# Patient Record
Sex: MALE | Race: WHITE | NOT HISPANIC OR LATINO | Employment: FULL TIME | URBAN - METROPOLITAN AREA
[De-identification: names, ages, dates, MRNs, and addresses within clinical notes are randomized per-mention and may not be internally consistent; named-entity substitution may affect disease eponyms.]

---

## 2017-03-09 ENCOUNTER — APPOINTMENT (OUTPATIENT)
Dept: CT IMAGING | Facility: HOSPITAL | Age: 67
End: 2017-03-09

## 2017-03-09 ENCOUNTER — HOSPITAL ENCOUNTER (INPATIENT)
Facility: HOSPITAL | Age: 67
LOS: 4 days | Discharge: HOME | End: 2017-03-13
Attending: EMERGENCY MEDICINE | Admitting: HOSPITALIST

## 2017-03-09 DIAGNOSIS — H47.021 OPTIC NERVE HEMORRHAGE, RIGHT: ICD-10-CM

## 2017-03-09 DIAGNOSIS — I16.0 HYPERTENSIVE URGENCY: Primary | ICD-10-CM

## 2017-03-09 LAB
ALBUMIN SERPL-MCNC: 4 G/DL (ref 3.5–5.2)
ALBUMIN/GLOB SERPL: 1.3 G/DL
ALP SERPL-CCNC: 63 U/L (ref 39–117)
ALT SERPL W P-5'-P-CCNC: 17 U/L (ref 1–41)
ANION GAP SERPL CALCULATED.3IONS-SCNC: 11.2 MMOL/L
AST SERPL-CCNC: 16 U/L (ref 1–40)
BASOPHILS # BLD AUTO: 0.05 10*3/MM3 (ref 0–0.2)
BASOPHILS NFR BLD AUTO: 0.9 % (ref 0–1.5)
BILIRUB SERPL-MCNC: 0.5 MG/DL (ref 0.1–1.2)
BUN BLD-MCNC: 19 MG/DL (ref 8–23)
BUN/CREAT SERPL: 12.2 (ref 7–25)
CALCIUM SPEC-SCNC: 9 MG/DL (ref 8.6–10.5)
CHLORIDE SERPL-SCNC: 101 MMOL/L (ref 98–107)
CO2 SERPL-SCNC: 27.8 MMOL/L (ref 22–29)
CREAT BLD-MCNC: 1.56 MG/DL (ref 0.76–1.27)
DEPRECATED RDW RBC AUTO: 43.4 FL (ref 37–54)
EOSINOPHIL # BLD AUTO: 0.21 10*3/MM3 (ref 0–0.7)
EOSINOPHIL NFR BLD AUTO: 3.8 % (ref 0.3–6.2)
ERYTHROCYTE [DISTWIDTH] IN BLOOD BY AUTOMATED COUNT: 13.9 % (ref 11.5–14.5)
GFR SERPL CREATININE-BSD FRML MDRD: 45 ML/MIN/1.73
GLOBULIN UR ELPH-MCNC: 3.1 GM/DL
GLUCOSE BLD-MCNC: 105 MG/DL (ref 65–99)
HCT VFR BLD AUTO: 42.8 % (ref 40.4–52.2)
HGB BLD-MCNC: 14.4 G/DL (ref 13.7–17.6)
HOLD SPECIMEN: NORMAL
HOLD SPECIMEN: NORMAL
IMM GRANULOCYTES # BLD: 0 10*3/MM3 (ref 0–0.03)
IMM GRANULOCYTES NFR BLD: 0 % (ref 0–0.5)
INR PPP: 1.06 (ref 0.9–1.1)
LYMPHOCYTES # BLD AUTO: 1.21 10*3/MM3 (ref 0.9–4.8)
LYMPHOCYTES NFR BLD AUTO: 21.7 % (ref 19.6–45.3)
MCH RBC QN AUTO: 29 PG (ref 27–32.7)
MCHC RBC AUTO-ENTMCNC: 33.6 G/DL (ref 32.6–36.4)
MCV RBC AUTO: 86.1 FL (ref 79.8–96.2)
MONOCYTES # BLD AUTO: 0.51 10*3/MM3 (ref 0.2–1.2)
MONOCYTES NFR BLD AUTO: 9.1 % (ref 5–12)
NEUTROPHILS # BLD AUTO: 3.6 10*3/MM3 (ref 1.9–8.1)
NEUTROPHILS NFR BLD AUTO: 64.5 % (ref 42.7–76)
NT-PROBNP SERPL-MCNC: 136 PG/ML (ref 0–900)
PLATELET # BLD AUTO: 190 10*3/MM3 (ref 140–500)
PMV BLD AUTO: 10.1 FL (ref 6–12)
POTASSIUM BLD-SCNC: 4.2 MMOL/L (ref 3.5–5.2)
PROT SERPL-MCNC: 7.1 G/DL (ref 6–8.5)
PROTHROMBIN TIME: 13.4 SECONDS (ref 11.7–14.2)
RBC # BLD AUTO: 4.97 10*6/MM3 (ref 4.6–6)
SODIUM BLD-SCNC: 140 MMOL/L (ref 136–145)
TROPONIN T SERPL-MCNC: <0.01 NG/ML (ref 0–0.03)
TROPONIN T SERPL-MCNC: <0.01 NG/ML (ref 0–0.03)
WBC NRBC COR # BLD: 5.58 10*3/MM3 (ref 4.5–10.7)
WHOLE BLOOD HOLD SPECIMEN: NORMAL
WHOLE BLOOD HOLD SPECIMEN: NORMAL

## 2017-03-09 PROCEDURE — 83880 ASSAY OF NATRIURETIC PEPTIDE: CPT | Performed by: EMERGENCY MEDICINE

## 2017-03-09 PROCEDURE — 99285 EMERGENCY DEPT VISIT HI MDM: CPT

## 2017-03-09 PROCEDURE — 85610 PROTHROMBIN TIME: CPT | Performed by: EMERGENCY MEDICINE

## 2017-03-09 PROCEDURE — 84484 ASSAY OF TROPONIN QUANT: CPT | Performed by: NURSE PRACTITIONER

## 2017-03-09 PROCEDURE — 93005 ELECTROCARDIOGRAM TRACING: CPT | Performed by: EMERGENCY MEDICINE

## 2017-03-09 PROCEDURE — 99253 IP/OBS CNSLTJ NEW/EST LOW 45: CPT | Performed by: INTERNAL MEDICINE

## 2017-03-09 PROCEDURE — 70450 CT HEAD/BRAIN W/O DYE: CPT

## 2017-03-09 PROCEDURE — 25010000002 HYDRALAZINE PER 20 MG: Performed by: NURSE PRACTITIONER

## 2017-03-09 PROCEDURE — 25810000003 SODIUM CHLORIDE 0.9 % WITH KCL 20 MEQ 20-0.9 MEQ/L-% SOLUTION: Performed by: HOSPITALIST

## 2017-03-09 PROCEDURE — 80053 COMPREHEN METABOLIC PANEL: CPT | Performed by: EMERGENCY MEDICINE

## 2017-03-09 PROCEDURE — 85025 COMPLETE CBC W/AUTO DIFF WBC: CPT | Performed by: EMERGENCY MEDICINE

## 2017-03-09 PROCEDURE — 93010 ELECTROCARDIOGRAM REPORT: CPT | Performed by: INTERNAL MEDICINE

## 2017-03-09 PROCEDURE — 84484 ASSAY OF TROPONIN QUANT: CPT | Performed by: EMERGENCY MEDICINE

## 2017-03-09 RX ORDER — HYDRALAZINE HYDROCHLORIDE 20 MG/ML
20 INJECTION INTRAMUSCULAR; INTRAVENOUS ONCE
Status: COMPLETED | OUTPATIENT
Start: 2017-03-09 | End: 2017-03-09

## 2017-03-09 RX ORDER — CLONIDINE HYDROCHLORIDE 0.1 MG/1
0.1 TABLET ORAL ONCE
Status: DISCONTINUED | OUTPATIENT
Start: 2017-03-09 | End: 2017-03-09

## 2017-03-09 RX ORDER — LEFLUNOMIDE 10 MG/1
10 TABLET ORAL DAILY
COMMUNITY
End: 2018-07-04

## 2017-03-09 RX ORDER — LABETALOL HYDROCHLORIDE 5 MG/ML
10 INJECTION, SOLUTION INTRAVENOUS ONCE
Status: DISCONTINUED | OUTPATIENT
Start: 2017-03-09 | End: 2017-03-09

## 2017-03-09 RX ORDER — AMLODIPINE BESYLATE 10 MG/1
10 TABLET ORAL DAILY
Status: DISCONTINUED | OUTPATIENT
Start: 2017-03-09 | End: 2017-03-13 | Stop reason: HOSPADM

## 2017-03-09 RX ORDER — LEFLUNOMIDE 10 MG/1
10 TABLET ORAL DAILY
Status: DISCONTINUED | OUTPATIENT
Start: 2017-03-09 | End: 2017-03-13 | Stop reason: HOSPADM

## 2017-03-09 RX ORDER — SODIUM CHLORIDE AND POTASSIUM CHLORIDE 150; 900 MG/100ML; MG/100ML
75 INJECTION, SOLUTION INTRAVENOUS CONTINUOUS
Status: DISCONTINUED | OUTPATIENT
Start: 2017-03-09 | End: 2017-03-12

## 2017-03-09 RX ORDER — ASPIRIN 81 MG/1
81 TABLET, CHEWABLE ORAL DAILY
COMMUNITY

## 2017-03-09 RX ORDER — HYDRALAZINE HYDROCHLORIDE 20 MG/ML
10 INJECTION INTRAMUSCULAR; INTRAVENOUS EVERY 4 HOURS PRN
Status: DISCONTINUED | OUTPATIENT
Start: 2017-03-09 | End: 2017-03-13 | Stop reason: HOSPADM

## 2017-03-09 RX ORDER — CLONIDINE HYDROCHLORIDE 0.1 MG/1
0.1 TABLET ORAL ONCE
Status: COMPLETED | OUTPATIENT
Start: 2017-03-09 | End: 2017-03-09

## 2017-03-09 RX ORDER — SODIUM CHLORIDE 0.9 % (FLUSH) 0.9 %
10 SYRINGE (ML) INJECTION AS NEEDED
Status: DISCONTINUED | OUTPATIENT
Start: 2017-03-09 | End: 2017-03-13 | Stop reason: HOSPADM

## 2017-03-09 RX ORDER — ASPIRIN 81 MG/1
81 TABLET, CHEWABLE ORAL DAILY
Status: DISCONTINUED | OUTPATIENT
Start: 2017-03-09 | End: 2017-03-13 | Stop reason: HOSPADM

## 2017-03-09 RX ORDER — SODIUM PHOSPHATE,MONO-DIBASIC 19G-7G/118
ENEMA (ML) RECTAL DAILY
Status: ON HOLD | COMMUNITY
End: 2017-03-09

## 2017-03-09 RX ORDER — PANTOPRAZOLE SODIUM 40 MG/1
40 TABLET, DELAYED RELEASE ORAL
Status: DISCONTINUED | OUTPATIENT
Start: 2017-03-10 | End: 2017-03-11

## 2017-03-09 RX ADMIN — HYDRALAZINE HYDROCHLORIDE 20 MG: 20 INJECTION INTRAMUSCULAR; INTRAVENOUS at 18:40

## 2017-03-09 RX ADMIN — METOPROLOL TARTRATE 25 MG: 25 TABLET ORAL at 21:03

## 2017-03-09 RX ADMIN — AMLODIPINE BESYLATE 10 MG: 10 TABLET ORAL at 17:30

## 2017-03-09 RX ADMIN — CLONIDINE HYDROCHLORIDE 0.1 MG: 0.1 TABLET ORAL at 13:42

## 2017-03-09 RX ADMIN — POTASSIUM CHLORIDE AND SODIUM CHLORIDE 75 ML/HR: 900; 150 INJECTION, SOLUTION INTRAVENOUS at 17:30

## 2017-03-09 NOTE — ED NOTES
"Pt denies chest pain and any pain or discomfort. Pt c/o dull headache at times, but nothing \"that was really bad\"     Meseret Joseph, RN  03/09/17 2042    "

## 2017-03-09 NOTE — H&P
CHIEF COMPLAINT:  Increased blood pressure.     HISTORY:  A 66-year-old white male with no significant past medical history, except for osteoarthritis/rheumatoid arthritis, who has some vision problem; it has been followed by ophthalmology, as he described floater that moves in the right eye with some mild headache. Ended up in ER; workup in ER reveals very high blood pressure with hypertensive urgency; admitted for management. Patient denies any chest pain, but he does have some headache, he does have some weakness, and he still has problem with right eye which has been followed by his ophthalmologist. No trauma, injury, or fall.     PAST MEDICAL HISTORY:   1.  Osteoarthritis.  2.  Rheumatoid arthritis.     PAST SURGICAL HISTORY:  Status post hernia repair     SOCIAL HISTORY:  No tobacco, alcohol, drug abuse.     FAMILY HISTORY:  Not contributory.     MEDICATIONS:   1.  Arava.  2.  Enbrel.  3.  Aspirin.  4.  Chondroitin.    PHYSICAL EXAMINATION:  GENERAL: Very pleasant, cooperative white male; no respiratory distress.   VITAL SIGNS: Afebrile, pulse 57, respiration 18, blood pressure 192/107, O2 saturation is 97% room air.   HEENT: Unremarkable, except for his right vision issue which is followed by ophthalmology.   NECK: Supple.   LUNGS: Clear.   HEART: S1, S2.   ABDOMEN: Soft, nontender. Bowel sounds positive.   EXTREMITIES: No edema.     NEUROLOGICAL: No focal deficit.     DIAGNOSTIC DATA:  BUN 19, creatinine 1.56, GFR of 45, potassium 4.2. INR okay. CBC okay. CT of the head is unremarkable. EKG showed sinus rhythm with nonspecific ST-T wave changes; no old EKG for comparison. He does have chronic bradycardia; he has had this for a long time.     ASSESSMENT:  1.  Hypertensive urgency.   2.  Right eye problem, per Ophthalmology.   3.  Dehydration.   4.  Rheumatoid arthritis.   5.  Osteoarthritis.     PLAN:  1.  Admit.   2.  Control the blood pressure, start Norvasc, and use hydralazine p.r.n.   3.  Check  echocardiogram.   4.  Cardiology to see.   5.  IV fluid.  6.  Stress ulcer/DVT prophylaxis.   7.  Follow closely. Further recommendation according to hospital course.       Newton Burger M.D.  AA:ms  D:   03/09/2017 16:27:06  T:   03/09/2017 17:09:29  Job ID:   [id number]  Document ID:   [id number]  cc:   Az Tinoco M.D.

## 2017-03-09 NOTE — ED NOTES
Chief Complaint   Patient presents with   • Hypertension     [facility] here for b/p and bleeding behind the eyes sent ehre for further evaluation           Jackie Eckert RN  03/09/17 7426

## 2017-03-09 NOTE — ED PROVIDER NOTES
" EMERGENCY DEPARTMENT ENCOUNTER    CHIEF COMPLAINT  Chief Complaint: Hypertension  History given by: patient  History limited by: none  Room Number: 20/20  PMD: No Known Provider   PMD - Dr. Az Parry      HPI:  Pt is a 66 y.o. male who presents complaining of hypertension onset today.  The pt says he was at [facility] c/o right eye visual disturbance and was sent to the ED due to elevated BP. The pt describes the visual disturbance as \"floaters\" that move in the visual field of his right eye for the past 2 days - pt reports this does not affect his ability to see well.  He also c/o mild HA.   He denies balance problems, ataxia, CP, SOA, weakness/numbness  He denies trauma or injury.     Pt states he takes 81 mg ASA daily.     Duration:  Onset today  Onset: gradual  Timing: constant  Location: blood pressure, eyes  Radiation: N/A  Quality: visual disturbance  Intensity/Severity: mild  Progression: unchanged  Associated Symptoms: visual disturbance right eye  Aggravating Factors: none  Alleviating Factors: none  Previous Episodes: denies  Treatment before arrival: none    PAST MEDICAL HISTORY  Active Ambulatory Problems     Diagnosis Date Noted   • No Active Ambulatory Problems     Resolved Ambulatory Problems     Diagnosis Date Noted   • No Resolved Ambulatory Problems     Past Medical History   Diagnosis Date   • Arthritis        PAST SURGICAL HISTORY  Past Surgical History   Procedure Laterality Date   • Hernia repair         FAMILY HISTORY  History reviewed. No pertinent family history.    SOCIAL HISTORY  Social History     Social History   • Marital status:      Spouse name: N/A   • Number of children: N/A   • Years of education: N/A     Occupational History   • Not on file.     Social History Main Topics   • Smoking status: Never Smoker   • Smokeless tobacco: Not on file   • Alcohol use No   • Drug use: No   • Sexual activity: Not on file     Other Topics Concern   • Not on file     Social " History Narrative   • No narrative on file       ALLERGIES  Review of patient's allergies indicates no known allergies.    REVIEW OF SYSTEMS  Review of Systems   Constitutional: Negative for chills and fever.   HENT: Negative for sore throat and trouble swallowing.    Eyes: Positive for visual disturbance (right eye).   Respiratory: Negative for cough and shortness of breath.    Cardiovascular: Negative for chest pain and leg swelling.   Gastrointestinal: Negative for abdominal pain, diarrhea and vomiting.   Endocrine: Negative.    Genitourinary: Negative for decreased urine volume and frequency.   Musculoskeletal: Negative for neck pain.   Skin: Negative for rash.   Allergic/Immunologic: Negative.    Neurological: Positive for headaches (very mild). Negative for weakness and numbness.   Hematological: Negative.    Psychiatric/Behavioral: Negative.    All other systems reviewed and are negative.      PHYSICAL EXAM  ED Triage Vitals   Temp Heart Rate Resp BP SpO2   03/09/17 1134 03/09/17 1134 03/09/17 1140 03/09/17 1144 03/09/17 1134   97.7 °F (36.5 °C) 66 18 237/114 97 %      Temp src Heart Rate Source Patient Position BP Location FiO2 (%)   -- -- -- 03/09/17 1144 --      Right arm        Physical Exam   Constitutional: He is oriented to person, place, and time. No distress.   HENT:   Head: Normocephalic and atraumatic.   Eyes: EOM are normal.   Pupils dilated bilat from eye exam [facility].     Left eye funduscopic- Optic disc no obvious blurring of margins, vascular intact, no signs of hemorrhage.     Right eye funduscopic -possible small hemorrhage right outer lower quadrant   Neck: Normal range of motion.   Cardiovascular: Normal rate, regular rhythm and normal heart sounds.    No murmur heard.  Pulses:       Posterior tibial pulses are 2+ on the right side, and 2+ on the left side.   Pulmonary/Chest: Effort normal and breath sounds normal. No respiratory distress. He has no wheezes.   Abdominal: Soft. Bowel sounds are  normal. There is no tenderness. There is no rebound and no guarding.   Musculoskeletal: Normal range of motion. He exhibits no edema.   Neurological: He is alert and oriented to person, place, and time. No cranial nerve deficit. Coordination normal.   Sensation and strength intact.    No focal neuro deficits.    Skin: Skin is warm and dry.   Psychiatric: Affect normal.   Nursing note and vitals reviewed.      LAB RESULTS  Lab Results (last 24 hours)     Procedure Component Value Units Date/Time    BNP [[id number]]  (Normal) Collected:  03/09/17 1216    Specimen:  Blood from Arm, Right Updated:  03/09/17 1257     proBNP 136.0 pg/mL     Narrative:       Among patients with dyspnea, NT-proBNP is highly sensitive for the detection of acute congestive heart failure. In addition NT-proBNP of <300 pg/ml effectively rules out acute congestive heart failure with 99% negative predictive value.    CBC & Differential [[id number]] Collected:  03/09/17 1216    Specimen:  Blood Updated:  03/09/17 1235    Narrative:       The following orders were created for panel order CBC & Differential.  Procedure                               Abnormality         Status                     ---------                               -----------         ------                     CBC Auto Differential[[id number]]         Normal              Final result                 Please view results for these tests on the individual orders.    Comprehensive Metabolic Panel [[id number]]  (Abnormal) Collected:  03/09/17 1216    Specimen:  Blood from Arm, Right Updated:  03/09/17 1259     Glucose 105 (H) mg/dL      BUN 19 mg/dL      Creatinine 1.56 (H) mg/dL      Sodium 140 mmol/L      Potassium 4.2 mmol/L      Chloride 101 mmol/L      CO2 27.8 mmol/L      Calcium 9.0 mg/dL      Total Protein 7.1 g/dL      Albumin 4.00 g/dL      ALT (SGPT) 17 U/L      AST (SGOT) 16 U/L      Alkaline Phosphatase 63 U/L      Total Bilirubin 0.5 mg/dL      eGFR Non  Amer 45 (L)  mL/min/1.73      Globulin 3.1 gm/dL      A/G Ratio 1.3 g/dL      BUN/Creatinine Ratio 12.2      Anion Gap 11.2 mmol/L     Protime-INR [[id number]]  (Normal) Collected:  03/09/17 1216    Specimen:  Blood from Arm, Right Updated:  03/09/17 1243     Protime 13.4 Seconds      INR 1.06     Troponin [[id number]]  (Normal) Collected:  03/09/17 1216    Specimen:  Blood from Arm, Right Updated:  03/09/17 1259     Troponin T <0.010 ng/mL     Narrative:       Troponin T Reference Ranges:  Less than 0.03 ng/mL:    Negative for AMI  0.03 to 0.09 ng/mL:      Indeterminant for AMI  Greater than 0.09 ng/mL: Positive for AMI    CBC Auto Differential [[id number]]  (Normal) Collected:  03/09/17 1216    Specimen:  Blood from Arm, Right Updated:  03/09/17 1235     WBC 5.58 10*3/mm3      RBC 4.97 10*6/mm3      Hemoglobin 14.4 g/dL      Hematocrit 42.8 %      MCV 86.1 fL      MCH 29.0 pg      MCHC 33.6 g/dL      RDW 13.9 %      RDW-SD 43.4 fl      MPV 10.1 fL      Platelets 190 10*3/mm3      Neutrophil % 64.5 %      Lymphocyte % 21.7 %      Monocyte % 9.1 %      Eosinophil % 3.8 %      Basophil % 0.9 %      Immature Grans % 0.0 %      Neutrophils, Absolute 3.60 10*3/mm3      Lymphocytes, Absolute 1.21 10*3/mm3      Monocytes, Absolute 0.51 10*3/mm3      Eosinophils, Absolute 0.21 10*3/mm3      Basophils, Absolute 0.05 10*3/mm3      Immature Grans, Absolute 0.00 10*3/mm3           I ordered the above labs and reviewed the results    RADIOLOGY  CT Head Without Contrast   Final Result       No evidence for acute intracranial pathology.       This report was finalized on 3/9/2017 1:51 PM by Dr. Daniel Dyer MD.               I ordered the above noted radiological studies. Interpreted by radiologist. Discussed with radiologist (Manisha). Reviewed by me in PACS.       PROCEDURES  Procedures      PROGRESS AND CONSULTS  ED Course     Interval: baseline  1a. Level Of Consciousness: 0-->Alert: keenly responsive  1b. LOC Questions: 0-->Answers both  questions correctly  1c. LOC Commands: 0-->Performs both tasks correctly  2. Best Gaze: 0-->Normal  3. Visual: 0--> No visual loss  4. Facial Palsy: 0-->Normal symmetrical movements  5a. Motor Arm, Left: 0-->No drift: limb holds 90 (or 45) degrees for full 10 secs  5b. Motor Arm, Right: 0-->No drift: limb holds 90 (or 45) degrees for full 10 secs  6a. Motor Leg, Left: 0-->No drift: leg holds 30 degree position for full 5 secs  6b. Motor Leg, Right: 0-->No drift: leg holds 30 degree position for full 5 secs  7. Limb Ataxia: 0-->Absent  8. Sensory: 0-->Normal: no sensory loss  9. Best Language: 0-->No aphasia: normal  10. Dysarthria: 0-->Normal  11. Extinction and Inattention (formerly Neglect): 0-->No abnormality    Total (NIH Stroke Scale): 0      12:05 PM  BP currently 215/118.  D/w pt plan of care which includes CT HEAD and plan for admission due to end organ failure due to chronic HTN.     12:16 PM  Ordered Troponin, BNP, PT with INR, blood work, EKG, and CT HEAD for further evaluation.     1:19 PM  Per the nurse pt's BP is now 178/99.    1:54 PM  Call returned by Dr. Burger who agrees to admit.     2:04 PM  Call returned by Dr. Lane (Ophthalmology) who agrees to consult.       MEDICAL DECISION MAKING  Results were reviewed/discussed with the patient and they were also made aware of online access. Pt also made aware that some labs, such as cultures, will not be resulted during ER visit and follow up with PMD is necessary.     MDM  Number of Diagnoses or Management Options  Hypertensive urgency:   Optic nerve hemorrhage, right:      Amount and/or Complexity of Data Reviewed  Clinical lab tests: reviewed and ordered (CREAT - 1.56)  Tests in the radiology section of CPT®: reviewed and ordered (CT HEAD - negative acute     Independently viewed by me. Interpreted by radiologist. Discussed with Radiologist.   )  Tests in the medicine section of CPT®: reviewed and ordered (EKG           EKG time: 1229  Rhythm/Rate:  NSR, 60's  P waves and NJ: normal, normal  QRS, axis: normal normal   ST and T waves: normal, normal     Interpreted Contemporaneously by me, independently viewed  No prior for comparison.)  Discussion of test results with the performing providers: yes (Dr. Dyer - radiology )  Decide to obtain previous medical records or to obtain history from someone other than the patient: yes (Hemorrhage optic nerve sheath of the right eye superior temporal aspect of disc.  Visual on fundus photo taken today. )  Review and summarize past medical records: yes  Discuss the patient with other providers: yes (Dr. Burger - hospitalist  Dr. Lane - ophthalmology )           DIAGNOSIS  Final diagnoses:   Hypertensive urgency   Optic nerve hemorrhage, right       DISPOSITION  admit    Latest Documented Vital Signs:  As of 1:56 PM  BP- 155/89 HR- 59 Temp- 97.7 °F (36.5 °C) O2 sat- 94%    --  Documentation assistance provided by america Echeverria for Dr. Skaggs.  Information recorded by the scribe was done at my direction and has been verified and validated by me.     Chidi Echeverria  03/09/17 0569       Rebecca Skaggs MD  03/11/17 5523

## 2017-03-09 NOTE — CONSULTS
"[facility]  Cardiology Consult Note    Patient Identification:  Name: Richar Brito  Age: 66 y.o.  Sex: male  :  1950  MRN: [id number]             Requesting Physician: Dr Burger    Reason for Consultation / Chief Complaint:  Hypertensive urgency    History of Present Illness:     This patient is a 66-year-old white male new to our service.  He denies history of MI, arrhythmia, congestive heart failure or previous ischemic workup.  He takes Aravaa and Enbrel under the direction of a rheumatologist for treatment of rheumatoid arthritis.     Patient went to see his eye doctor earlier this morning after noticing a \"cloud\" like shadow in the right lateral visual field of his right eye yesterday.  This morning he had a \"mild\" headache behind both eyes.  He was sent to the emergency room after he was noted to have markedly elevated blood pressure.  He received a dose of clonidine upon arrival for blood pressure 237/114.  He denies any history of chest pain, pressure, tightness, palpitations, dizziness, lightheadedness, shortness of breath, PND, orthopnea, nausea/vomiting, weakness, near syncope or syncope.    Admission EKG (see below) shows a sinus rhythm.  His first set of cardiac enzymes are negative.  .  CT of the head showed no acute intracranial pathology.    Comorbid cardiac risk factors: Hypertension    Past Medical History:  Past Medical History   Diagnosis Date   • Arthritis      Past Surgical History:  Past Surgical History   Procedure Laterality Date   • Hernia repair        Allergies:  No Known Allergies  Home Meds:  Prescriptions Prior to Admission   Medication Sig Dispense Refill Last Dose   • aspirin 81 MG chewable tablet Chew 81 mg Daily.      • leflunomide (ARAVA) 10 MG tablet Take 10 mg by mouth Daily.        Current Meds:   [unfilled]  Social History:   Social History   Substance Use Topics   • Smoking status: Never Smoker   • Smokeless tobacco: Not on file   • Alcohol use No "      Family History:  History reviewed. No pertinent family history.     Review of Systems    Constitutional: No weakness,fatigue, fever, rigors, chills   Eyes: OD vision changes as dictated above    ENT/oropharynx: No difficulty swallowing, sore throat, epistaxis, changes in hearing   Cardiovascular: No chest pain, chest tightness, palpitations, paroxysmal nocturnal dyspnea, orthopnea, diaphoresis, dizziness / syncopal episode   Respiratory: No shortness of breath, dyspnea on exertion, cough, wheezing   Gastrointestinal: No abdominal pain, nausea, vomiting, diarrhea   Genitourinary: No hematuria, + urinary hesitancy and frequency    Neurological:  headache. No tremors, numbness,  one-sided weakness    Musculoskeletal: No cramp   Integument: No rash, edema           Constitutional:  Temp:  [97.5 °F (36.4 °C)-97.7 °F (36.5 °C)] 97.5 °F (36.4 °C)  Heart Rate:  [52-66] 57  Resp:  [18] 18  BP: (153-237)/() 192/107    Physical Exam by Gerson Mendez MD  General:  Well-developed, well-nourished white male resting quietly.  Appears in no acute distress  Eyes: PERTL,  HEENT:  No JVD. Thyroid not visibly enlarged. No mucosal pallor or cyanosis  Respiratory: Respirations regular and unlabored at rest. Equal chest expansion. BBS with good air entry in all fields. No crackles, rubs or wheezes auscultated  Cardiovascular: S1S2 Regular rate and rhythm. No murmur, rub or gallop auscultated. No carotid bruits. DP pulses 2  No pretibial pitting edema  Gastrointestinal: Abdomen soft, flat, non tender. Bowel sounds present. No ascites  Musculoskeletal: QUINTEROS x4. No abnormal movements  Extremities: No digital clubbing or cyanosis  Skin: Color pink. Skin warm and dry to touch. No rashes  No xanthoma  Neuro: AAO x3 CN II-XII grossly intact        Cardiographics  ECG:       Telemetry:  Sinus  52-60's , no ectopy      Echocardiogram: pending    Imaging  Chest X-ray: none available for review    CT HEAD IMPRESSION:    No evidence  for acute intracranial pathology      Lab Review     Results from last 7 days  Lab Units 03/09/17  1216   TROPONIN T ng/mL <0.010           Results from last 7 days  Lab Units 03/09/17  1216   SODIUM mmol/L 140   POTASSIUM mmol/L 4.2   BUN mg/dL 19   CREATININE mg/dL 1.56*   CALCIUM mg/dL 9.0       Results from last 7 days  Lab Units 03/09/17  1216   WBC 10*3/mm3 5.58   HEMOGLOBIN g/dL 14.4   HEMATOCRIT % 42.8   PLATELETS 10*3/mm3 190       Results from last 7 days  Lab Units 03/09/17  1216   INR  1.06         Assessment:  - Hypertensive urgency  - OD vision changes  - Renal insufficiency  - Rheumatoid arthritis    Recommendations / Plan:     In summary, this is a 66-year-old male who presents with uncontrolled hypertension.  His blood pressure remains elevated (192/107) despite Clonidine in the ER.  Heart rate is in the 55-60 range.  He will be given a dose of IV hydralazine now and orders entered for PRN administration.  It is unclear as to whether or not his creatinine elevation is chronic, secondary to rheumatoid arthritis or untreated hypertension.  We'll avoid ACE-I and ARB at this point  Beta blocker will be added to Norvasc and aspirin.  Serial cardiac enzymes, EKGs will be checked to rule out acute cardiac ischemic event.  Review 2-D echo.  TSH is pending  Further recommendations to treatment plan pending.  Thank you for allowing us to participate in his care      I reviewed the patient's clinical results, medications and treatment plan . I personally viewed and interpreted the patient's EKG/Telemetry data      Patient personally interviewed and above subjective findings personally confirmed during a face to face contact with patient today  All findings of physical examination confirmed  All labs, cardiac procedures rtinent radiographs of the last 24 hours personally reviewed  Impression and plans discussed/elaborated and implemented jointly as described above         Gerson Mendez MD  3/9/2017,  4:45 PM      EMR Dragon/Transcription disclaimer:   Much of this encounter note is an electronic transcription/translation of spoken language to printed text. The electronic translation of spoken language may permit erroneous, or at times, nonsensical words or phrases to be inadvertently transcribed; Although I have reviewed the note for such errors, some may still exist.

## 2017-03-10 ENCOUNTER — APPOINTMENT (OUTPATIENT)
Dept: NUCLEAR MEDICINE | Facility: HOSPITAL | Age: 67
End: 2017-03-10

## 2017-03-10 LAB
ALBUMIN SERPL-MCNC: 3.7 G/DL (ref 3.5–5.2)
ALBUMIN/GLOB SERPL: 1.4 G/DL
ALP SERPL-CCNC: 56 U/L (ref 39–117)
ALT SERPL W P-5'-P-CCNC: 12 U/L (ref 1–41)
ANION GAP SERPL CALCULATED.3IONS-SCNC: 11.7 MMOL/L
AST SERPL-CCNC: 15 U/L (ref 1–40)
BASOPHILS # BLD AUTO: 0.06 10*3/MM3 (ref 0–0.2)
BASOPHILS NFR BLD AUTO: 0.8 % (ref 0–1.5)
BH CV STRESS BP STAGE 1: NORMAL
BH CV STRESS BP STAGE 2: NORMAL
BH CV STRESS DURATION MIN STAGE 1: 3
BH CV STRESS DURATION MIN STAGE 2: 0
BH CV STRESS DURATION SEC STAGE 1: 0
BH CV STRESS DURATION SEC STAGE 2: 50
BH CV STRESS GRADE STAGE 1: 10
BH CV STRESS GRADE STAGE 2: 12
BH CV STRESS HR STAGE 1: 139
BH CV STRESS HR STAGE 2: 144
BH CV STRESS METS STAGE 1: 5
BH CV STRESS METS STAGE 2: 7.5
BH CV STRESS PROTOCOL 1: NORMAL
BH CV STRESS RECOVERY BP: NORMAL MMHG
BH CV STRESS RECOVERY HR: 104 BPM
BH CV STRESS SPEED STAGE 1: 1.7
BH CV STRESS SPEED STAGE 2: 2.5
BH CV STRESS STAGE 1: 1
BH CV STRESS STAGE 2: 2
BILIRUB SERPL-MCNC: 0.6 MG/DL (ref 0.1–1.2)
BUN BLD-MCNC: 19 MG/DL (ref 8–23)
BUN/CREAT SERPL: 15.4 (ref 7–25)
CALCIUM SPEC-SCNC: 8.5 MG/DL (ref 8.6–10.5)
CHLORIDE SERPL-SCNC: 102 MMOL/L (ref 98–107)
CHOLEST SERPL-MCNC: 168 MG/DL (ref 0–200)
CO2 SERPL-SCNC: 23.3 MMOL/L (ref 22–29)
CREAT BLD-MCNC: 1.23 MG/DL (ref 0.76–1.27)
DEPRECATED RDW RBC AUTO: 44.4 FL (ref 37–54)
EOSINOPHIL # BLD AUTO: 0.16 10*3/MM3 (ref 0–0.7)
EOSINOPHIL NFR BLD AUTO: 2.2 % (ref 0.3–6.2)
ERYTHROCYTE [DISTWIDTH] IN BLOOD BY AUTOMATED COUNT: 13.8 % (ref 11.5–14.5)
GFR SERPL CREATININE-BSD FRML MDRD: 59 ML/MIN/1.73
GLOBULIN UR ELPH-MCNC: 2.7 GM/DL
GLUCOSE BLD-MCNC: 102 MG/DL (ref 65–99)
HBA1C MFR BLD: 5.18 % (ref 4.8–5.6)
HCT VFR BLD AUTO: 42.6 % (ref 40.4–52.2)
HDLC SERPL-MCNC: 32 MG/DL (ref 40–60)
HGB BLD-MCNC: 14.3 G/DL (ref 13.7–17.6)
IMM GRANULOCYTES # BLD: 0.02 10*3/MM3 (ref 0–0.03)
IMM GRANULOCYTES NFR BLD: 0.3 % (ref 0–0.5)
LDLC SERPL CALC-MCNC: 114 MG/DL (ref 0–100)
LDLC/HDLC SERPL: 3.57 {RATIO}
LV EF NUC BP: 60 %
LYMPHOCYTES # BLD AUTO: 1.05 10*3/MM3 (ref 0.9–4.8)
LYMPHOCYTES NFR BLD AUTO: 14.6 % (ref 19.6–45.3)
MAXIMAL PREDICTED HEART RATE: 154 BPM
MCH RBC QN AUTO: 29.4 PG (ref 27–32.7)
MCHC RBC AUTO-ENTMCNC: 33.6 G/DL (ref 32.6–36.4)
MCV RBC AUTO: 87.7 FL (ref 79.8–96.2)
MONOCYTES # BLD AUTO: 0.74 10*3/MM3 (ref 0.2–1.2)
MONOCYTES NFR BLD AUTO: 10.3 % (ref 5–12)
NEUTROPHILS # BLD AUTO: 5.16 10*3/MM3 (ref 1.9–8.1)
NEUTROPHILS NFR BLD AUTO: 71.8 % (ref 42.7–76)
NT-PROBNP SERPL-MCNC: 98.2 PG/ML (ref 5–900)
PERCENT MAX PREDICTED HR: 93.51 %
PLATELET # BLD AUTO: 178 10*3/MM3 (ref 140–500)
PMV BLD AUTO: 10.9 FL (ref 6–12)
POTASSIUM BLD-SCNC: 4.7 MMOL/L (ref 3.5–5.2)
PROT SERPL-MCNC: 6.4 G/DL (ref 6–8.5)
RBC # BLD AUTO: 4.86 10*6/MM3 (ref 4.6–6)
SODIUM BLD-SCNC: 137 MMOL/L (ref 136–145)
STRESS BASELINE BP: NORMAL MMHG
STRESS BASELINE HR: 94 BPM
STRESS PERCENT HR: 110 %
STRESS POST ESTIMATED WORKLOAD: 5.4 METS
STRESS POST EXERCISE DUR MIN: 3 MIN
STRESS POST EXERCISE DUR SEC: 50 SEC
STRESS POST PEAK BP: NORMAL MMHG
STRESS POST PEAK HR: 144 BPM
STRESS TARGET HR: 131 BPM
TRIGL SERPL-MCNC: 109 MG/DL (ref 0–150)
TROPONIN T SERPL-MCNC: <0.01 NG/ML (ref 0–0.03)
TSH SERPL DL<=0.05 MIU/L-ACNC: 3.55 MIU/ML (ref 0.27–4.2)
VLDLC SERPL-MCNC: 21.8 MG/DL (ref 5–40)
WBC NRBC COR # BLD: 7.19 10*3/MM3 (ref 4.5–10.7)

## 2017-03-10 PROCEDURE — 85025 COMPLETE CBC W/AUTO DIFF WBC: CPT | Performed by: HOSPITALIST

## 2017-03-10 PROCEDURE — 84443 ASSAY THYROID STIM HORMONE: CPT | Performed by: HOSPITALIST

## 2017-03-10 PROCEDURE — 93017 CV STRESS TEST TRACING ONLY: CPT

## 2017-03-10 PROCEDURE — 78452 HT MUSCLE IMAGE SPECT MULT: CPT | Performed by: INTERNAL MEDICINE

## 2017-03-10 PROCEDURE — 84484 ASSAY OF TROPONIN QUANT: CPT | Performed by: NURSE PRACTITIONER

## 2017-03-10 PROCEDURE — 93016 CV STRESS TEST SUPVJ ONLY: CPT | Performed by: INTERNAL MEDICINE

## 2017-03-10 PROCEDURE — 83880 ASSAY OF NATRIURETIC PEPTIDE: CPT | Performed by: HOSPITALIST

## 2017-03-10 PROCEDURE — A9500 TC99M SESTAMIBI: HCPCS | Performed by: NURSE PRACTITIONER

## 2017-03-10 PROCEDURE — 83036 HEMOGLOBIN GLYCOSYLATED A1C: CPT | Performed by: HOSPITALIST

## 2017-03-10 PROCEDURE — 80061 LIPID PANEL: CPT | Performed by: HOSPITALIST

## 2017-03-10 PROCEDURE — 25010000002 HYDRALAZINE PER 20 MG: Performed by: HOSPITALIST

## 2017-03-10 PROCEDURE — 25810000003 SODIUM CHLORIDE 0.9 % WITH KCL 20 MEQ 20-0.9 MEQ/L-% SOLUTION: Performed by: HOSPITALIST

## 2017-03-10 PROCEDURE — 80053 COMPREHEN METABOLIC PANEL: CPT | Performed by: HOSPITALIST

## 2017-03-10 PROCEDURE — 93010 ELECTROCARDIOGRAM REPORT: CPT | Performed by: INTERNAL MEDICINE

## 2017-03-10 PROCEDURE — 0 TECHNETIUM SESTAMIBI: Performed by: NURSE PRACTITIONER

## 2017-03-10 PROCEDURE — 93005 ELECTROCARDIOGRAM TRACING: CPT | Performed by: HOSPITALIST

## 2017-03-10 PROCEDURE — A9500 TC99M SESTAMIBI: HCPCS | Performed by: INTERNAL MEDICINE

## 2017-03-10 PROCEDURE — 93018 CV STRESS TEST I&R ONLY: CPT | Performed by: INTERNAL MEDICINE

## 2017-03-10 PROCEDURE — 25010000002 ONDANSETRON PER 1 MG: Performed by: HOSPITALIST

## 2017-03-10 PROCEDURE — 99232 SBSQ HOSP IP/OBS MODERATE 35: CPT | Performed by: INTERNAL MEDICINE

## 2017-03-10 PROCEDURE — 78452 HT MUSCLE IMAGE SPECT MULT: CPT

## 2017-03-10 PROCEDURE — 0 TECHNETIUM SESTAMIBI: Performed by: INTERNAL MEDICINE

## 2017-03-10 RX ORDER — ONDANSETRON 2 MG/ML
4 INJECTION INTRAMUSCULAR; INTRAVENOUS EVERY 4 HOURS PRN
Status: DISCONTINUED | OUTPATIENT
Start: 2017-03-10 | End: 2017-03-13 | Stop reason: HOSPADM

## 2017-03-10 RX ORDER — HYDROCODONE BITARTRATE AND ACETAMINOPHEN 5; 325 MG/1; MG/1
1 TABLET ORAL EVERY 4 HOURS PRN
Status: DISCONTINUED | OUTPATIENT
Start: 2017-03-10 | End: 2017-03-11

## 2017-03-10 RX ORDER — HYDRALAZINE HYDROCHLORIDE 20 MG/ML
10 INJECTION INTRAMUSCULAR; INTRAVENOUS ONCE
Status: COMPLETED | OUTPATIENT
Start: 2017-03-10 | End: 2017-03-10

## 2017-03-10 RX ORDER — ATORVASTATIN CALCIUM 40 MG/1
40 TABLET, FILM COATED ORAL NIGHTLY
Status: DISCONTINUED | OUTPATIENT
Start: 2017-03-10 | End: 2017-03-11

## 2017-03-10 RX ORDER — ACETAMINOPHEN 325 MG/1
650 TABLET ORAL EVERY 4 HOURS PRN
Status: DISCONTINUED | OUTPATIENT
Start: 2017-03-10 | End: 2017-03-12

## 2017-03-10 RX ADMIN — METOPROLOL TARTRATE 25 MG: 25 TABLET ORAL at 20:54

## 2017-03-10 RX ADMIN — Medication 1 DOSE: at 10:35

## 2017-03-10 RX ADMIN — Medication 1 DOSE: at 08:05

## 2017-03-10 RX ADMIN — ONDANSETRON 4 MG: 2 INJECTION INTRAMUSCULAR; INTRAVENOUS at 17:02

## 2017-03-10 RX ADMIN — ASPIRIN 81 MG: 81 TABLET, CHEWABLE ORAL at 08:23

## 2017-03-10 RX ADMIN — AMLODIPINE BESYLATE 10 MG: 10 TABLET ORAL at 08:23

## 2017-03-10 RX ADMIN — LEFLUNOMIDE 10 MG: 10 TABLET ORAL at 08:24

## 2017-03-10 RX ADMIN — ATORVASTATIN CALCIUM 40 MG: 40 TABLET, FILM COATED ORAL at 20:53

## 2017-03-10 RX ADMIN — ACETAMINOPHEN 650 MG: 325 TABLET ORAL at 05:30

## 2017-03-10 RX ADMIN — METOPROLOL TARTRATE 25 MG: 25 TABLET ORAL at 08:24

## 2017-03-10 RX ADMIN — POTASSIUM CHLORIDE AND SODIUM CHLORIDE 125 ML/HR: 900; 150 INJECTION, SOLUTION INTRAVENOUS at 18:47

## 2017-03-10 RX ADMIN — HYDRALAZINE HYDROCHLORIDE 5 MG: 20 INJECTION INTRAMUSCULAR; INTRAVENOUS at 05:06

## 2017-03-10 RX ADMIN — HYDROCODONE BITARTRATE AND ACETAMINOPHEN 1 TABLET: 5; 325 TABLET ORAL at 17:02

## 2017-03-10 RX ADMIN — PANTOPRAZOLE SODIUM 40 MG: 40 TABLET, DELAYED RELEASE ORAL at 05:05

## 2017-03-10 RX ADMIN — HYDRALAZINE HYDROCHLORIDE 10 MG: 20 INJECTION INTRAMUSCULAR; INTRAVENOUS at 18:47

## 2017-03-10 RX ADMIN — HYDRALAZINE HYDROCHLORIDE 10 MG: 20 INJECTION INTRAMUSCULAR; INTRAVENOUS at 15:20

## 2017-03-10 RX ADMIN — POTASSIUM CHLORIDE AND SODIUM CHLORIDE 75 ML/HR: 900; 150 INJECTION, SOLUTION INTRAVENOUS at 04:58

## 2017-03-10 RX ADMIN — HYDRALAZINE HYDROCHLORIDE 10 MG: 20 INJECTION INTRAMUSCULAR; INTRAVENOUS at 12:18
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
## 2017-03-10 NOTE — PROGRESS NOTES
" DAILY PROGRESS NOTE      CHIEF COMPLAINT:    DOING BETTER  NO NEW COMPLAINTS    HISTORY:  A 66-year-old white male with no significant past medical history, except for osteoarthritis/rheumatoid arthritis, who has some vision problem; it has been followed by ophthalmology, as he described floater that moves in the right eye with some mild headache. Ended up in ER; workup in ER reveals very high blood pressure with hypertensive urgency; admitted for management. Patient denies any chest pain, but he does have some headache, he does have some weakness, and he still has problem with right eye which has been followed by his ophthalmologist. No trauma, injury, or fall.     PHYSICAL EXAMINATION:Blood pressure 166/96, pulse 70, temperature 97.6 °F (36.4 °C), temperature source Oral, resp. rate 16, height 68\" (172.7 cm), weight 196 lb 6.4 oz (89.1 kg), SpO2 95 %.    GENERAL: Very pleasant, cooperative white male; no respiratory distress.   HEENT: Unremarkable, except for his right vision issue which is followed by ophthalmology.   NECK: Supple.   LUNGS: Clear.   HEART: S1, S2.   ABDOMEN: Soft, nontender. Bowel sounds positive.   EXTREMITIES: No edema.   NEUROLOGICAL: No focal deficit.     DIAGNOSTIC DATA:    Lab Results (last 24 hours)     Procedure Component Value Units Date/Time    [place] Draw [[id number]] Collected:  03/09/17 1216    Specimen:  Blood Updated:  03/09/17 1701    Narrative:       The following orders were created for panel order [place] Draw.  Procedure                               Abnormality         Status                     ---------                               -----------         ------                     Light Blue Top[[id number]]                                    Final result               Green Top (Gel)[[id number]]                                   Final result               Lavender Top[[id number]]                                      Final result               Gold Top - SST[[id number]]                "                     Final result                 Please view results for these tests on the individual orders.    Light Blue Top [[id number]] Collected:  03/09/17 1216    Specimen:  Blood from Arm, Right Updated:  03/09/17 1701     Extra Tube hold for add-on       Auto resulted       Green Top (Gel) [[id number]] Collected:  03/09/17 1216    Specimen:  Blood from Arm, Right Updated:  03/09/17 1701     Extra Tube Hold for add-ons.       Auto resulted.       Lavender Top [[id number]] Collected:  03/09/17 1216    Specimen:  Blood from Arm, Right Updated:  03/09/17 1701     Extra Tube hold for add-on       Auto resulted       Gold Top - SST [[id number]] Collected:  03/09/17 1216    Specimen:  Blood from Arm, Right Updated:  03/09/17 1701     Extra Tube Hold for add-ons.       Auto resulted.       Troponin [[id number]]  (Normal) Collected:  03/09/17 1825    Specimen:  Blood Updated:  03/09/17 1929     Troponin T <0.010 ng/mL     Narrative:       Troponin T Reference Ranges:  Less than 0.03 ng/mL:    Negative for AMI  0.03 to 0.09 ng/mL:      Indeterminant for AMI  Greater than 0.09 ng/mL: Positive for AMI    Troponin [[id number]]  (Normal) Collected:  03/10/17 0008    Specimen:  Blood Updated:  03/10/17 0051     Troponin T <0.010 ng/mL     Narrative:       Troponin T Reference Ranges:  Less than 0.03 ng/mL:    Negative for AMI  0.03 to 0.09 ng/mL:      Indeterminant for AMI  Greater than 0.09 ng/mL: Positive for AMI    CBC & Differential [[id number]] Collected:  03/10/17 0444    Specimen:  Blood Updated:  03/10/17 0523    Narrative:       The following orders were created for panel order CBC & Differential.  Procedure                               Abnormality         Status                     ---------                               -----------         ------                     CBC Auto Differential[[id number]]         Abnormal            Final result                 Please view results for these tests on the individual orders.    CBC  Auto Differential [[id number]]  (Abnormal) Collected:  03/10/17 0444    Specimen:  Blood Updated:  03/10/17 0523     WBC 7.19 10*3/mm3      RBC 4.86 10*6/mm3      Hemoglobin 14.3 g/dL      Hematocrit 42.6 %      MCV 87.7 fL      MCH 29.4 pg      MCHC 33.6 g/dL      RDW 13.8 %      RDW-SD 44.4 fl      MPV 10.9 fL      Platelets 178 10*3/mm3      Neutrophil % 71.8 %      Lymphocyte % 14.6 (L) %      Monocyte % 10.3 %      Eosinophil % 2.2 %      Basophil % 0.8 %      Immature Grans % 0.3 %      Neutrophils, Absolute 5.16 10*3/mm3      Lymphocytes, Absolute 1.05 10*3/mm3      Monocytes, Absolute 0.74 10*3/mm3      Eosinophils, Absolute 0.16 10*3/mm3      Basophils, Absolute 0.06 10*3/mm3      Immature Grans, Absolute 0.02 10*3/mm3     Hemoglobin A1c [[id number]]  (Normal) Collected:  03/10/17 0444    Specimen:  Blood Updated:  03/10/17 0530     Hemoglobin A1C 5.18 %     Narrative:       Hemoglobin A1C Ranges:    Increased Risk for Diabetes  5.7% to 6.4%  Diabetes                     >= 6.5%  Diabetic Goal                < 7.0%    Comprehensive Metabolic Panel [[id number]]  (Abnormal) Collected:  03/10/17 0444    Specimen:  Blood Updated:  03/10/17 0558     Glucose 102 (H) mg/dL      BUN 19 mg/dL      Creatinine 1.23 mg/dL      Sodium 137 mmol/L      Potassium 4.7 mmol/L      Chloride 102 mmol/L      CO2 23.3 mmol/L      Calcium 8.5 (L) mg/dL      Total Protein 6.4 g/dL      Albumin 3.70 g/dL      ALT (SGPT) 12 U/L      AST (SGOT) 15 U/L      Alkaline Phosphatase 56 U/L      Total Bilirubin 0.6 mg/dL      eGFR Non African Amer 59 (L) mL/min/1.73      Globulin 2.7 gm/dL      A/G Ratio 1.4 g/dL      BUN/Creatinine Ratio 15.4      Anion Gap 11.7 mmol/L     Lipid Panel [[id number]]  (Abnormal) Collected:  03/10/17 0444    Specimen:  Blood Updated:  03/10/17 0558     Total Cholesterol 168 mg/dL      Triglycerides 109 mg/dL      HDL Cholesterol 32 (L) mg/dL      LDL Cholesterol  114 (H) mg/dL      VLDL Cholesterol 21.8 mg/dL       LDL/HDL Ratio 3.57     Narrative:       Cholesterol Reference Ranges  (U.S. Department of Health and Human Services ATP III Classifications)    Desirable          <200 mg/dL  Borderline High    200-239 mg/dL  High Risk          >240 mg/dL      Triglyceride Reference Ranges  (U.S. Department of Health and Human Services ATP III Classifications)    Normal           <150 mg/dL  Borderline High  150-199 mg/dL  High             200-499 mg/dL  Very High        >500 mg/dL    HDL Reference Ranges  (U.S. Department of Health and Human Services ATP III Classifcations)    Low     <40 mg/dl (major risk factor for CHD)  High    >60 mg/dl ('negative' risk factor for CHD)        LDL Reference Ranges  (U.S. Department of Health and Human Services ATP III Classifcations)    Optimal          <100 mg/dL  Near Optimal     100-129 mg/dL  Borderline High  130-159 mg/dL  High             160-189 mg/dL  Very High        >189 mg/dL    TSH [[id number]]  (Normal) Collected:  03/10/17 0444    Specimen:  Blood Updated:  03/10/17 0600     TSH 3.550 mIU/mL     BNP [[id number]]  (Normal) Collected:  03/10/17 0444    Specimen:  Blood Updated:  03/10/17 0600     proBNP 98.2 pg/mL     Narrative:       Among patients with dyspnea, NT-proBNP is highly sensitive for the detection of acute congestive heart failure. In addition NT-proBNP of <300 pg/ml effectively rules out acute congestive heart failure with 99% negative predictive value.      BUN 19, creatinine 1.56, GFR of 45, potassium 4.2. INR okay. CBC okay. CT of the head is unremarkable. EKG showed sinus rhythm with nonspecific ST-T wave changes; no old EKG for comparison. He does have chronic bradycardia; he has had this for a long time.   Imaging Results (last 72 hours)     Procedure Component Value Units Date/Time    CT Head Without Contrast [[id number]] Collected:  03/09/17 1333     Updated:  03/09/17 1354    Narrative:       CT SCAN HEAD WITHOUT CONTRAST     CLINICAL HISTORY: Headache and  hypertension.     CT scan of the head was obtained with 3 mm axial images. No intravenous  contrast was administered.     Comparison is made to prior MRI of the brain dated 04/21/2010.     FINDINGS:     The ventricles, sulci, and cisterns are age appropriate. The basal  ganglia and thalami are unremarkable in appearance. The posterior fossa  structures are within normal limits. Incidental note is made of cavum  septum pellucidum et vergae. Incidental note is made of either a pal  cisterna magna or a arachnoid cyst posterior to the cerebellar  hemispheres. This is unchanged since previous imaging.       Impression:          No evidence for acute intracranial pathology.     This report was finalized on 3/9/2017 1:51 PM by Dr. Daniel Dyer MD.           Current Facility-Administered Medications:   •  acetaminophen (TYLENOL) tablet 650 mg, 650 mg, Oral, Q4H PRN, Newton Burger MD, 650 mg at 03/10/17 0530  •  amLODIPine (NORVASC) tablet 10 mg, 10 mg, Oral, Daily, Newton Burger MD, 10 mg at 03/10/17 0823  •  aspirin chewable tablet 81 mg, 81 mg, Oral, Daily, Newton Burger MD, 81 mg at 03/10/17 0823  •  hydrALAZINE (APRESOLINE) injection 10 mg, 10 mg, Intravenous, Q4H PRN, Newton Burger MD, 10 mg at 03/10/17 1218  •  leflunomide (ARAVA) tablet 10 mg, 10 mg, Oral, Daily, Newton Burger MD, 10 mg at 03/10/17 0824  •  metoprolol tartrate (LOPRESSOR) tablet 25 mg, 25 mg, Oral, Q12H, Andreea Dumont, APRN, 25 mg at 03/10/17 0824  •  pantoprazole (PROTONIX) EC tablet 40 mg, 40 mg, Oral, Q AM, Newton Burger MD, 40 mg at 03/10/17 0505  •  Insert peripheral IV, , , Once **AND** sodium chloride 0.9 % flush 10 mL, 10 mL, Intravenous, PRN, Rebecca Skaggs MD  •  sodium chloride 0.9 % with KCl 20 mEq/L infusion, 75 mL/hr, Intravenous, Continuous, Newton Burger MD, Last Rate: 75 mL/hr at 03/10/17 0458, 75 mL/hr at 03/10/17 0458       ASSESSMENT:  1.  Hypertensive urgency.   2.  Right eye problem, per Ophthalmology.   3.  Dehydration.   4.   Rheumatoid arthritis.   5.  Osteoarthritis.     PLAN:  1.  CPM  2.  Control the blood pressure,  Norvasc, and  hydralazine p.r.n.   3.  Echocardiogram. PENDING  4.  Cardiology NOTE NOTED  5.  STRESS TEST TODAT  6.  Stress ulcer/DVT prophylaxis.   7.  Follow closely. Further recommendation according to hospital course.       Newton Burger M.D.

## 2017-03-10 NOTE — PROGRESS NOTES
"[facility]  Cardiology Progress Note    Patient Identification:  Name: Richar Brito  Age: 66 y.o.  Sex: male  :  1950  MRN: [id number]                 Follow Up / Chief Complaint: Hypertensive urgency, dyslipidemia    Interval History:  He denies history of MI, arrhythmia, congestive heart failure or previous ischemic workup. He was sent to the emergency room after he was noted to have markedly elevated blood pressure. He received a dose of clonidine upon arrival for blood pressure 237/114. He denies any history of chest pain, pressure, tightness, palpitations, dizziness, lightheadedness, shortness of breath, PND, orthopnea, nausea/vomiting, weakness, near syncope or syncope.     Subjective:  Denies any chest pain, pressure, tightness, shortness of breath, nausea or vomiting.  Still reports \"cloudy\" right lateral visual field.  Denies any headache.    Discussed cardiac test results to date with patient and spouse.  All questions were answered.  They voiced understanding    Objective:  BP improved: 160s/70s on Norvasc and Lopressor     CE neg x3.   TSH wnl.  HDL and LDL not at goal      Past Medical History:  Past Medical History   Diagnosis Date   • Arthritis      Past Surgical History:  Past Surgical History   Procedure Laterality Date   • Hernia repair          Social History:   Social History   Substance Use Topics   • Smoking status: Never Smoker   • Smokeless tobacco: Not on file   • Alcohol use No      Family History:  History reviewed. No pertinent family history.       Allergies:  No Known Allergies  Scheduled Meds:    amLODIPine 10 mg Daily   aspirin 81 mg Daily   leflunomide 10 mg Daily   metoprolol tartrate 25 mg Q12H   pantoprazole 40 mg Q AM           INTAKE AND OUTPUT:    Intake/Output Summary (Last 24 hours) at 03/10/17 1154  Last data filed at 03/10/17 0502   Gross per 24 hour   Intake   2150 ml   Output      0 ml   Net   2150 ml       Review of Systems:   GI: No nausea or " vomiting  Cardiac: No chest pain, tightness  Pulmonary: No shortness of breath or cough    Constitutional:  Temp:  [97.3 °F (36.3 °C)-97.7 °F (36.5 °C)] 97.5 °F (36.4 °C)  Heart Rate:  [52-73] 60  Resp:  [16-18] 16  BP: (137-192)/() 166/71    Physical Exam by Gerson Mendez MD  General:  Awake, alert and resting quietly.  Appears in no acute distress  Eyes: PERTL,  HEENT: Thyroid not visibly enlarged. No mucosal pallor or cyanosis  Respiratory: Respirations regular and unlabored at rest. BBS with good air entry in all fields. No crackles or wheezes auscultated  Cardiovascular: S1S2 Regular rate and rhythm. No murmur. No pretibial pitting edema  Gastrointestinal: Abdomen soft, flat, non tender. Bowel sounds present.   Musculoskeletal: QUINTEROS x4. No abnormal movements  Extremities: No digital clubbing or cyanosis  Skin: Color pink. Skin warm and dry to touch. No rashes  Neuro: AAO x3 CN II-XII grossly intact  Psych: Mood and affect normal, pleasant and cooperative          Cardiographics  Telemetry: SR  60's-70's      ECG 3-10-17      Echocardiogram: Pending    Lab Review     Results from last 7 days  Lab Units 03/10/17  0008 03/09/17  1825 03/09/17  1216   TROPONIN T ng/mL <0.010 <0.010 <0.010       Results from last 7 days  Lab Units 03/10/17  0444   SODIUM mmol/L 137   POTASSIUM mmol/L 4.7   BUN mg/dL 19   CREATININE mg/dL 1.23   CALCIUM mg/dL 8.5*       Results from last 7 days  Lab Units 03/10/17  0444 03/09/17  1216   WBC 10*3/mm3 7.19 5.58   HEMOGLOBIN g/dL 14.3 14.4   HEMATOCRIT % 42.6 42.8   PLATELETS 10*3/mm3 178 190      3/10/2017 04:44   Total Cholesterol 168   HDL Cholesterol 32 (L)   LDL Cholesterol  114 (H)   VLDL Cholesterol 21.8   Triglycerides 109   LDL/HDL Ratio 3.57         Assessment:  - Hypertensive urgency  - OD vision changes  - Renal insufficiency  - Rheumatoid arthritis  - Dyslipidemia      Plan:  - Hypertensive urgency - improved control on Norvasc and Lopressor. MI ruled out.  Stress test this morning is normal Review echo once completed    - Dyslipidemia - states he is working with PCP on non-statin management strategies      Continue current Lopressor and Norvasc doses.  Review echo once completed and stress test      I reviewed the patient's new clinical results and treatment plan. I personally viewed and interpreted the patient's EKG/Telemetry data    )3/10/2017  Gerson Mendez MD      EMR Dragon/Transcription disclaimer:   Much of this encounter note is an electronic transcription/translation of spoken language to printed text. The electronic translation of spoken language may permit erroneous, or at times, nonsensical words or phrases to be inadvertently transcribed; Although I have reviewed the note for such errors, some may still exist.

## 2017-03-10 NOTE — PLAN OF CARE
Problem: Patient Care Overview (Adult)  Goal: Plan of Care Review  Outcome: Ongoing (interventions implemented as appropriate)    03/10/17 0403   Coping/Psychosocial Response Interventions   Plan Of Care Reviewed With patient   Patient Care Overview   Progress no change   Outcome Evaluation   Outcome Summary/Follow up Plan Pt is a new admit .Cont to monitor for inc bp.Pt denies any pain or discomfort .Will continue to monitor.       Goal: Adult Individualization and Mutuality  Outcome: Ongoing (interventions implemented as appropriate)    Problem: Hypertensive Disease/Crisis (Arterial) (Adult)  Goal: Signs and Symptoms of Listed Potential Problems Will be Absent or Manageable (Hypertensive Disease/Crisis)  Outcome: Ongoing (interventions implemented as appropriate)

## 2017-03-10 NOTE — PROGRESS NOTES
Discharge Planning Assessment  [facility]     Patient Name: Richar Brito  MRN: [id number]  Today's Date: 3/10/2017    Admit Date: 3/9/2017          Discharge Needs Assessment       03/10/17 1650    Living Environment    Lives With spouse    Living Arrangements house    Provides Primary Care For no one    Quality Of Family Relationships supportive    Able to Return to Prior Living Arrangements yes    Discharge Needs Assessment    Concerns To Be Addressed denies needs/concerns at this time    Readmission Within The Last 30 Days no previous admission in last 30 days    Equipment Currently Used at Home none    Equipment Needed After Discharge none    Transportation Available car;family or friend will provide            Discharge Plan       03/10/17 1651    Case Management/Social Work Plan    Plan home with wife    Patient/Family In Agreement With Plan yes   spoke with patient/wife and son Masood    Additional Comments Facesheet verified.  IMM documented.  Patient noted that he is independent with adl's.  Denies use of any assistive devices.  Drives.  Does plan back home at discharge with assist of family as needed if needed, and currently denies any discharge planning needs.  Des MICHELLE,CCP        Discharge Placement     No information found                Demographic Summary       03/10/17 1648    Referral Information    Admission Type inpatient    Arrived From home or self-care    Contact Information    Permission Granted to Share Information With family/designee    Comments wife Clotilde, and son Masood    Primary Care Physician Information    Name Dr. Az Tinoco    Phone  [phone]            Functional Status       03/10/17 1649    Functional Status Current    Ambulation 0-->independent    Transferring 0-->independent    Toileting 0-->independent    Bathing 0-->independent    Dressing 0-->independent    Eating 0-->independent    Communication 0-->understands/communicates without difficulty    Functional  Status Prior    Ambulation 0-->independent    Transferring 0-->independent    Toileting 0-->independent    Bathing 0-->independent    Dressing 0-->independent    Eating 0-->independent    Communication 0-->understands/communicates without difficulty    IADL    Medications independent    Meal Preparation independent    Housekeeping independent    Laundry independent    Shopping independent    Oral Care independent    Activity Tolerance    Current Activity Limitations none    Cognitive/Perceptual/Developmental    Current Mental Status/Cognitive Functioning no deficits noted                   Corrine Felix, RN
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
## 2017-03-10 NOTE — PLAN OF CARE
Problem: Patient Care Overview (Adult)  Goal: Plan of Care Review  Outcome: Ongoing (interventions implemented as appropriate)    03/10/17 1536   Coping/Psychosocial Response Interventions   Plan Of Care Reviewed With patient   Patient Care Overview   Progress no change   Outcome Evaluation   Outcome Summary/Follow up Plan BP continues to be elevated despite IV PRN Hydralazine being administered. C/O HA - med offered, but pt refuses - pt states that Tylenol doesn't work for him, uses Excedrine @ home for HA. Stress Test completed this AM = normal. Continue to monitor BP. Meds administered per MD orders - see MAR. Pt resting in bed @ this time - up ad amaya. Will continue to monitor.       Goal: Adult Individualization and Mutuality  Outcome: Ongoing (interventions implemented as appropriate)  Goal: Discharge Needs Assessment  Outcome: Ongoing (interventions implemented as appropriate)    Problem: Hypertensive Disease/Crisis (Arterial) (Adult)  Goal: Signs and Symptoms of Listed Potential Problems Will be Absent or Manageable (Hypertensive Disease/Crisis)  Outcome: Ongoing (interventions implemented as appropriate)    Problem: Pain, Acute (Adult)  Goal: Identify Related Risk Factors and Signs and Symptoms  Outcome: Outcome(s) achieved Date Met:  03/10/17  Goal: Acceptable Pain Control/Comfort Level  Outcome: Ongoing (interventions implemented as appropriate)

## 2017-03-11 ENCOUNTER — APPOINTMENT (OUTPATIENT)
Dept: MRI IMAGING | Facility: HOSPITAL | Age: 67
End: 2017-03-11

## 2017-03-11 ENCOUNTER — APPOINTMENT (OUTPATIENT)
Dept: CARDIOLOGY | Facility: HOSPITAL | Age: 67
End: 2017-03-11
Attending: HOSPITALIST

## 2017-03-11 LAB
ANION GAP SERPL CALCULATED.3IONS-SCNC: 11.8 MMOL/L
BUN BLD-MCNC: 14 MG/DL (ref 8–23)
BUN/CREAT SERPL: 14 (ref 7–25)
CALCIUM SPEC-SCNC: 8.4 MG/DL (ref 8.6–10.5)
CHLORIDE SERPL-SCNC: 105 MMOL/L (ref 98–107)
CO2 SERPL-SCNC: 21.2 MMOL/L (ref 22–29)
CREAT BLD-MCNC: 1 MG/DL (ref 0.76–1.27)
CRP SERPL-MCNC: 0.6 MG/DL (ref 0–0.5)
ERYTHROCYTE [SEDIMENTATION RATE] IN BLOOD: 6 MM/HR (ref 0–20)
GFR SERPL CREATININE-BSD FRML MDRD: 75 ML/MIN/1.73
GLUCOSE BLD-MCNC: 97 MG/DL (ref 65–99)
GLUCOSE BLDC GLUCOMTR-MCNC: 99 MG/DL (ref 70–130)
POTASSIUM BLD-SCNC: 4.4 MMOL/L (ref 3.5–5.2)
SODIUM BLD-SCNC: 138 MMOL/L (ref 136–145)

## 2017-03-11 PROCEDURE — 99254 IP/OBS CNSLTJ NEW/EST MOD 60: CPT | Performed by: PSYCHIATRY & NEUROLOGY

## 2017-03-11 PROCEDURE — 82962 GLUCOSE BLOOD TEST: CPT

## 2017-03-11 PROCEDURE — 80048 BASIC METABOLIC PNL TOTAL CA: CPT | Performed by: HOSPITALIST

## 2017-03-11 PROCEDURE — 93306 TTE W/DOPPLER COMPLETE: CPT | Performed by: INTERNAL MEDICINE

## 2017-03-11 PROCEDURE — 25810000003 SODIUM CHLORIDE 0.9 % WITH KCL 20 MEQ 20-0.9 MEQ/L-% SOLUTION: Performed by: HOSPITALIST

## 2017-03-11 PROCEDURE — 85652 RBC SED RATE AUTOMATED: CPT | Performed by: PSYCHIATRY & NEUROLOGY

## 2017-03-11 PROCEDURE — 25010000002 HYDRALAZINE PER 20 MG: Performed by: HOSPITALIST

## 2017-03-11 PROCEDURE — 99232 SBSQ HOSP IP/OBS MODERATE 35: CPT | Performed by: INTERNAL MEDICINE

## 2017-03-11 PROCEDURE — 93306 TTE W/DOPPLER COMPLETE: CPT

## 2017-03-11 PROCEDURE — 70553 MRI BRAIN STEM W/O & W/DYE: CPT

## 2017-03-11 PROCEDURE — 0 GADOBENATE DIMEGLUMINE 529 MG/ML SOLUTION: Performed by: HOSPITALIST

## 2017-03-11 PROCEDURE — 86140 C-REACTIVE PROTEIN: CPT | Performed by: PSYCHIATRY & NEUROLOGY

## 2017-03-11 PROCEDURE — A9577 INJ MULTIHANCE: HCPCS | Performed by: HOSPITALIST

## 2017-03-11 RX ORDER — HYDRALAZINE HYDROCHLORIDE 20 MG/ML
10 INJECTION INTRAMUSCULAR; INTRAVENOUS ONCE
Status: DISCONTINUED | OUTPATIENT
Start: 2017-03-11 | End: 2017-03-11

## 2017-03-11 RX ORDER — OXYCODONE HYDROCHLORIDE AND ACETAMINOPHEN 5; 325 MG/1; MG/1
1 TABLET ORAL EVERY 6 HOURS PRN
Status: DISCONTINUED | OUTPATIENT
Start: 2017-03-11 | End: 2017-03-13 | Stop reason: HOSPADM

## 2017-03-11 RX ORDER — PANTOPRAZOLE SODIUM 40 MG/1
40 TABLET, DELAYED RELEASE ORAL
Status: DISCONTINUED | OUTPATIENT
Start: 2017-03-11 | End: 2017-03-13 | Stop reason: HOSPADM

## 2017-03-11 RX ORDER — ATORVASTATIN CALCIUM 10 MG/1
10 TABLET, FILM COATED ORAL NIGHTLY
Status: DISCONTINUED | OUTPATIENT
Start: 2017-03-11 | End: 2017-03-13 | Stop reason: HOSPADM

## 2017-03-11 RX ADMIN — POTASSIUM CHLORIDE AND SODIUM CHLORIDE 125 ML/HR: 900; 150 INJECTION, SOLUTION INTRAVENOUS at 02:39

## 2017-03-11 RX ADMIN — GADOBENATE DIMEGLUMINE 20 ML: 529 INJECTION, SOLUTION INTRAVENOUS at 14:21

## 2017-03-11 RX ADMIN — AMLODIPINE BESYLATE 10 MG: 10 TABLET ORAL at 08:41

## 2017-03-11 RX ADMIN — LEFLUNOMIDE 10 MG: 10 TABLET ORAL at 08:41

## 2017-03-11 RX ADMIN — POTASSIUM CHLORIDE AND SODIUM CHLORIDE 125 ML/HR: 900; 150 INJECTION, SOLUTION INTRAVENOUS at 10:43

## 2017-03-11 RX ADMIN — HYDROCODONE BITARTRATE AND ACETAMINOPHEN 1 TABLET: 5; 325 TABLET ORAL at 08:41

## 2017-03-11 RX ADMIN — METOPROLOL TARTRATE 25 MG: 25 TABLET ORAL at 08:41

## 2017-03-11 RX ADMIN — PANTOPRAZOLE SODIUM 40 MG: 40 TABLET, DELAYED RELEASE ORAL at 05:54

## 2017-03-11 RX ADMIN — ASPIRIN 81 MG: 81 TABLET, CHEWABLE ORAL at 08:41

## 2017-03-11 RX ADMIN — HYDRALAZINE HYDROCHLORIDE 10 MG: 20 INJECTION INTRAMUSCULAR; INTRAVENOUS at 20:20

## 2017-03-11 RX ADMIN — ATORVASTATIN CALCIUM 10 MG: 10 TABLET, FILM COATED ORAL at 20:21

## 2017-03-11 RX ADMIN — HYDRALAZINE HYDROCHLORIDE 10 MG: 20 INJECTION INTRAMUSCULAR; INTRAVENOUS at 06:07

## 2017-03-11 RX ADMIN — PANTOPRAZOLE SODIUM 40 MG: 40 TABLET, DELAYED RELEASE ORAL at 17:35

## 2017-03-11 RX ADMIN — METOPROLOL TARTRATE 25 MG: 25 TABLET ORAL at 20:20

## 2017-03-11 NOTE — PROGRESS NOTES
Neuro  I find no evidence of significant neurologic disorder to cause the HA and fortunately it is getting better.   Perhaps Migraine since nauseous and has remote history of Migraine.  Doubt related to HBP.  No significant path on MRI.    Plan  ESR and CRP  Oxycodone prn for no more than a week  No other w/u now.    NATHAN Mcduffie MD

## 2017-03-11 NOTE — PLAN OF CARE
Problem: Patient Care Overview (Adult)  Goal: Plan of Care Review  Outcome: Ongoing (interventions implemented as appropriate)    03/11/17 0304   Coping/Psychosocial Response Interventions   Plan Of Care Reviewed With patient   Patient Care Overview   Progress no change   Outcome Evaluation   Outcome Summary/Follow up Plan Cont to monitor for inc bp.Pt vomited x1 last hs.Per pt he feels better now.Will continue to monitor.       Goal: Adult Individualization and Mutuality  Outcome: Ongoing (interventions implemented as appropriate)    Problem: Hypertensive Disease/Crisis (Arterial) (Adult)  Goal: Signs and Symptoms of Listed Potential Problems Will be Absent or Manageable (Hypertensive Disease/Crisis)  Outcome: Ongoing (interventions implemented as appropriate)    Problem: Pain, Acute (Adult)  Goal: Acceptable Pain Control/Comfort Level  Outcome: Ongoing (interventions implemented as appropriate)

## 2017-03-11 NOTE — PLAN OF CARE
Problem: Patient Care Overview (Adult)  Goal: Plan of Care Review  Outcome: Ongoing (interventions implemented as appropriate)    03/11/17 1814   Coping/Psychosocial Response Interventions   Plan Of Care Reviewed With patient   Patient Care Overview   Progress no change   Outcome Evaluation   Outcome Summary/Follow up Plan Pt has had 3 episodes of vomiting today - pt states that he's not really nauseous & that when he vomits, it just hits him - has refused Zofran since dose this AM. C/O constant HA that is 2-5/10 - refuse Norco since dose this AM - Norco D/C'd per Dr. Mcduffie - changed to Percocet. Neuro consulted - Dr. Mcduffie saw pt today. MRI Brain w & w/o contrast completed today. Ice pack seems to help pt's HA - offered Percocet, pt refused. Meds administered per MD orders - see MAR. Pt resting in bed @ this time. Will continue to monitor.       Goal: Adult Individualization and Mutuality  Outcome: Ongoing (interventions implemented as appropriate)  Goal: Discharge Needs Assessment  Outcome: Ongoing (interventions implemented as appropriate)    Problem: Hypertensive Disease/Crisis (Arterial) (Adult)  Goal: Signs and Symptoms of Listed Potential Problems Will be Absent or Manageable (Hypertensive Disease/Crisis)  Outcome: Ongoing (interventions implemented as appropriate)    Problem: Pain, Acute (Adult)  Goal: Acceptable Pain Control/Comfort Level  Outcome: Ongoing (interventions implemented as appropriate)

## 2017-03-11 NOTE — PROGRESS NOTES
" DAILY PROGRESS NOTE      CHIEF COMPLAINT:    C/O SEVERE HEADACHE WITH N/V    HISTORY:  A 66-year-old white male with no significant past medical history, except for osteoarthritis/rheumatoid arthritis, who has some vision problem; it has been followed by ophthalmology, as he described floater that moves in the right eye with some mild headache. Ended up in ER; workup in ER reveals very high blood pressure with hypertensive urgency; admitted for management. Patient denies any chest pain, but he does have some headache, he does have some weakness, and he still has problem with right eye which has been followed by his ophthalmologist. No trauma, injury, or fall.     PHYSICAL EXAMINATION:Blood pressure 155/89, pulse 68, temperature 97.5 °F (36.4 °C), temperature source Oral, resp. rate 16, height 68\" (172.7 cm), weight 200 lb 9 oz (91 kg), SpO2 95 %.    GENERAL: Very pleasant, cooperative white male; no respiratory distress.   HEENT: Unremarkable, except for his right vision issue which is followed by ophthalmology.   NECK: Supple.   LUNGS: Clear.   HEART: S1, S2.   ABDOMEN: Soft, nontender. Bowel sounds positive.   EXTREMITIES: No edema.   NEUROLOGICAL: No focal deficit.     DIAGNOSTIC DATA:    Lab Results (last 24 hours)     Procedure Component Value Units Date/Time    Basic Metabolic Panel [[id number]]  (Abnormal) Collected:  03/11/17 0636    Specimen:  Blood Updated:  03/11/17 0734     Glucose 97 mg/dL      BUN 14 mg/dL      Creatinine 1.00 mg/dL      Sodium 138 mmol/L      Potassium 4.4 mmol/L      Chloride 105 mmol/L      CO2 21.2 (L) mmol/L      Calcium 8.4 (L) mg/dL      eGFR Non African Amer 75 mL/min/1.73      BUN/Creatinine Ratio 14.0      Anion Gap 11.8 mmol/L     Narrative:       GFR Normal >60  Chronic Kidney Disease <60  Kidney Failure <15    POC Glucose Fingerstick [[id number]]  (Normal) Collected:  03/11/17 1100    Specimen:  Blood Updated:  03/11/17 1102     Glucose 99 mg/dL     Narrative:       Meter: " [id number] : [id number] Dedrick Montes De O[place]      BUN 19, creatinine 1.56, GFR of 45, potassium 4.2. INR okay. CBC okay. CT of the head is unremarkable. EKG showed sinus rhythm with nonspecific ST-T wave changes; no old EKG for comparison. He does have chronic bradycardia; he has had this for a long time.   Imaging Results (last 72 hours)     Procedure Component Value Units Date/Time    CT Head Without Contrast [[id number]] Collected:  03/09/17 1333     Updated:  03/09/17 1354    Narrative:       CT SCAN HEAD WITHOUT CONTRAST     CLINICAL HISTORY: Headache and hypertension.     CT scan of the head was obtained with 3 mm axial images. No intravenous  contrast was administered.     Comparison is made to prior MRI of the brain dated 04/21/2010.     FINDINGS:     The ventricles, sulci, and cisterns are age appropriate. The basal  ganglia and thalami are unremarkable in appearance. The posterior fossa  structures are within normal limits. Incidental note is made of cavum  septum pellucidum et vergae. Incidental note is made of either a pal  cisterna magna or a arachnoid cyst posterior to the cerebellar  hemispheres. This is unchanged since previous imaging.       Impression:          No evidence for acute intracranial pathology.     This report was finalized on 3/9/2017 1:51 PM by Dr. Daniel Dyer MD.           Current Facility-Administered Medications:   •  acetaminophen (TYLENOL) tablet 650 mg, 650 mg, Oral, Q4H PRN, Newton Burger MD, 650 mg at 03/10/17 0530  •  amLODIPine (NORVASC) tablet 10 mg, 10 mg, Oral, Daily, Newton Burger MD, 10 mg at 03/11/17 0841  •  aspirin chewable tablet 81 mg, 81 mg, Oral, Daily, Newton Burger MD, 81 mg at 03/11/17 0841  •  atorvastatin (LIPITOR) tablet 10 mg, 10 mg, Oral, Nightly, Newton Burger MD  •  hydrALAZINE (APRESOLINE) injection 10 mg, 10 mg, Intravenous, Q4H PRN, Newton Burger MD, 10 mg at 03/11/17 0607  •  HYDROcodone-acetaminophen (NORCO) 5-325 MG per tablet 1 tablet, 1 tablet, Oral,  Q4H PRN, Newton Burger MD, 1 tablet at 03/11/17 0841  •  leflunomide (ARAVA) tablet 10 mg, 10 mg, Oral, Daily, Newton Burger MD, 10 mg at 03/11/17 0841  •  metoprolol tartrate (LOPRESSOR) tablet 25 mg, 25 mg, Oral, Q12H, Andreea Dumont, APRN, 25 mg at 03/11/17 0841  •  ondansetron (ZOFRAN) injection 4 mg, 4 mg, Intravenous, Q4H PRN, Newton Burger MD, 4 mg at 03/10/17 1702  •  pantoprazole (PROTONIX) EC tablet 40 mg, 40 mg, Oral, BID AC, Newton Burger MD  •  Insert peripheral IV, , , Once **AND** sodium chloride 0.9 % flush 10 mL, 10 mL, Intravenous, PRN, Rebecca Skaggs MD  •  sodium chloride 0.9 % with KCl 20 mEq/L infusion, 75 mL/hr, Intravenous, Continuous, Newton Burger MD, Last Rate: 125 mL/hr at 03/11/17 1043, 125 mL/hr at 03/11/17 1043     STRESS TEST NEGATIVE      ASSESSMENT:  1.  Hypertensive urgency.   2.  Right eye problem, per Ophthalmology.   3.  HEADACHE WITH N/V  4.  Rheumatoid arthritis.   5.  Osteoarthritis.     PLAN:  1.  MRI BRAIN AND NEURO TO  SEE  2.  NORVASC 10 DAILY  3.  Echocardiogram. PENDING  4.  HOLD DISCHARGE  5.  STRESS TEST NEGATVE  6.  Stress ulcer/DVT prophylaxis.   7.  Follow closely. Further recommendation according to hospital course.       Newton Burger M.D.

## 2017-03-11 NOTE — CONSULTS
Neuro  Milan Mcduffie MD         NEUROLOGY CONSULTATION    REQUESTED BY: Newton Burger M.D.    CHIEF COMPLAINT:  Headache.     DIAGNOSIS:  Hypertension.     This is a 66-year-old man who had some floaters in his vision starting earlier this week. He made an appointment with his optometrist who saw him on Thursday. In the office she found a blood pressure of 174/104. According to the patient she sent him to the ER and he was admitted. He said the optometrist told him that he had a swollen optic nerve. He was subsequently admitted to the hospital and he has been seen by our ophthalmologist here. Specifically he was seen by Dr. Lane on 03/09/2017. He got 20/25 vision bilaterally. He felt the optic nerve looked normal except for some mild hypertensive retinopathy changes, namely small flame hemorrhages and found no other ophthalmologic problem. His blood pressure has come down but he has been complaining of headache over the last 48 hours. He says the headache was a 6 or 7, and the pain is now 2 or 3, so it does seem to be coming down. He has received some Norco with some bilateral throbbing pain. He vomited 4 times , the last time earlier today.    He rarely gets a headache. He does tell me that in his childhood he would get migraine headaches where he would get headache, photophobia and vomiting. But he has not had any in many years. He denies any pain with chewing. He has had no weight loss or other systemic symptoms. He has had no episodes of visual loss, but only these floaters described above.    He does tell me that a few years ago he had double vision which subsequently resolved, and because of that he ended up getting bilateral temporal artery biopsy, was told he did not have temporal arteritis.     The location of symptoms is bifrontal. Severity is moderate. Quality is throbbing. Duration is about 48 hours. Associated with symptoms of vomiting. This is in the context of a man admitted for hypertension.      PAST MEDICAL HISTORY:  1.  Osteoarthritis.   2.  Rheumatoid arthritis.     HOME MEDICATIONS:  1.  Araba   2.  Enbrel.  3.  Aspirin.  4.  Chondroitin.     SOCIAL HISTORY: No alcohol or drug abuse.    FAMILY HISTORY: Reviewed and noncontributory to present illness.    REVIEW OF SYSTEMS: 14-system review performed and other than the headaches and the visual problems noted above, all other systems are negative.     PHYSICAL EXAMINATION:  Reveals a man who appears stated age in no acute distress. Temperature 97.8, pulse 61, respirations 16. Most recent blood pressure 167/90. Over the last 24-hours they have run in the 160-170 / 70-90 range. Carotids are equal without bruits. Cardiac exam reveals regular rate and rhythm with no significant murmur or gallop. Extremities are not edematous. Pulses are intact. There is no rash, there is no hepatosplenomegaly. There are no labored respirations. There is no lymphadenopathy.     NEUROLOGIC EXAMINATION: Awake and alert and oriented x3. Visual fields are full. Discs are flat. Cranial nerves II-XII are intact. Power is normal in all 4 extremities. Tone is normal. Reflexes are 2 and symmetric in upper and lower extremities. Toes are downgoing. Sensation is intact to pin and joint position sense in all 4 extremities. Cerebellar function and gait are normal.    MRI of the brain done today was read as negative. I reviewed it and other than some nonspecific white matter findings, no other abnormalities are seen.     IMPRESSION: I am not sure why this patient has a headache. But I do not think there is any significant neurologic process here. It may be recurrence of his migraine, though he has not had it in years. High enough blood pressures can cause headache, but the pressures seem to be coming down, so I think this is likely a good explanation. There are no symptoms to suggests to temporal arteritis, but I will send sed rate and CRP. I do not see any evidence of a meningeal  process. It is possible this is a tension headache.     In total, I would consider CRP but I would not pursue it any further diagnostically. The headache seems to be doing better and I think some Oxycodone PRN (Hydrocodone with some minimal effect). I would not give him this long term but I would give him some so he can take it over the next 3-4 days if it persists. I do not think we need to keep him in the hospital for his headache.     Thank you for allowing me to see this patient.     serge Burger M.D.

## 2017-03-11 NOTE — PROGRESS NOTES
[facility]  Cardiology Progress Note    Patient Identification:  Name: Richar Brito  Age: 66 y.o.  Sex: male  :  1950  MRN: [id number]                 Follow Up / Chief Complaint: Hypertensive urgency, dyslipidemia    Interval History:  He denies history of MI, arrhythmia, congestive heart failure or previous ischemic workup. He was sent to the emergency room after he was noted to have markedly elevated blood pressure. He received a dose of clonidine upon arrival for blood pressure 237/114. He denies any history of chest pain, pressure, tightness, palpitations, dizziness, lightheadedness, shortness of breath, PND, orthopnea, nausea/vomiting, weakness, near syncope or syncope.     Subjective:  Denies any chest pain, pressure, tightness, shortness of breath.  Reporting headache and nausea / vomiting this am      Objective:  BP improved: 150s/80s on Norvasc and Lopressor       Past Medical History:  Past Medical History   Diagnosis Date   • Arthritis      Past Surgical History:  Past Surgical History   Procedure Laterality Date   • Hernia repair          Social History:   Social History   Substance Use Topics   • Smoking status: Never Smoker   • Smokeless tobacco: Not on file   • Alcohol use No      Family History:  History reviewed. No pertinent family history.       Allergies:  No Known Allergies  Scheduled Meds:    amLODIPine 10 mg Daily   aspirin 81 mg Daily   atorvastatin 10 mg Nightly   leflunomide 10 mg Daily   metoprolol tartrate 25 mg Q12H   pantoprazole 40 mg BID AC           INTAKE AND OUTPUT:    Intake/Output Summary (Last 24 hours) at 17 1353  Last data filed at 17 1043   Gross per 24 hour   Intake   2400 ml   Output      0 ml   Net   2400 ml        Review of Systems:   GI: +Nausea and vomiting this a.m.  Cardiac: No chest pain, tightness  Pulmonary: No shortness of breath or cough    Constitutional:  Temp:  [97.4 °F (36.3 °C)-97.9 °F (36.6 °C)] 97.5 °F (36.4 °C)  Heart  Rate:  [58-76] 68  Resp:  [16] 16  BP: (148-175)/(87-99) 155/89    Physical Exam by Gerson Mendez MD  General:  Awake, alert and resting quietly.  Appears in no acute distress  Eyes: PERTL,  HEENT: Thyroid not visibly enlarged. Oral  mucosa moist, no cyanosis  Respiratory: Respirations regular and unlabored at rest. BBS with good air entry in all fields. No crackles or wheezes auscultated  Cardiovascular: S1S2 Regular rate and rhythm. No murmur. No pretibial pitting edema  Gastrointestinal: Abdomen soft, flat, non tender. Bowel sounds present.   Musculoskeletal: QUINTEROS x4. No abnormal movements  Extremities: No digital cyanosis  Skin: Color pink. Skin warm and dry to touch.    Neuro: AAO x3  QUINTEROS x4          Cardiographics  Telemetry: SR  60's-70's        Echocardiogram: Pending    Lab Review     Results from last 7 days  Lab Units 03/10/17  0008 03/09/17  1825 03/09/17  1216   TROPONIN T ng/mL <0.010 <0.010 <0.010       Results from last 7 days  Lab Units 03/11/17  0636   SODIUM mmol/L 138   POTASSIUM mmol/L 4.4   BUN mg/dL 14   CREATININE mg/dL 1.00   CALCIUM mg/dL 8.4*       Assessment:  - Hypertensive urgency  - OD vision changes  - Renal insufficiency  - Rheumatoid arthritis  - Dyslipidemia      Plan:  - Hypertensive urgency - improved control on Norvasc and Lopressor. MI ruled out. Stress test was negative for ischemia    - Dyslipidemia - statin therapy started.  Patient states he is working with PCP on non-statin management strategies      Continue current Lopressor and Norvasc doses.  Start statin.  Repeat LFTs and lipid panel recommended in 8-12 weeks Review echo once completed and stress test      I reviewed the patient's new clinical results and treatment plan. I personally viewed and interpreted the patient's EKG/Telemetry data    )3/11/2017  Gerson Mendez MD      EMR Dragon/Transcription disclaimer:   Much of this encounter note is an electronic transcription/translation of spoken language  to printed text. The electronic translation of spoken language may permit erroneous, or at times, nonsensical words or phrases to be inadvertently transcribed; Although I have reviewed the note for such errors, some may still exist.

## 2017-03-12 LAB
ALBUMIN SERPL-MCNC: 3.6 G/DL (ref 3.5–5.2)
ALBUMIN/GLOB SERPL: 1.2 G/DL
ALP SERPL-CCNC: 61 U/L (ref 39–117)
ALT SERPL W P-5'-P-CCNC: 11 U/L (ref 1–41)
ANION GAP SERPL CALCULATED.3IONS-SCNC: 13.5 MMOL/L
AST SERPL-CCNC: 15 U/L (ref 1–40)
BASOPHILS # BLD AUTO: 0.03 10*3/MM3 (ref 0–0.2)
BASOPHILS NFR BLD AUTO: 0.3 % (ref 0–1.5)
BH CV ECHO MEAS - ACS: 2.3 CM
BH CV ECHO MEAS - AI DEC SLOPE: 108 CM/SEC^2
BH CV ECHO MEAS - AI MAX PG: 9.9 MMHG
BH CV ECHO MEAS - AI MAX VEL: 157 CM/SEC
BH CV ECHO MEAS - AI P1/2T: 425.8 MSEC
BH CV ECHO MEAS - AO MEAN PG (FULL): 0 MMHG
BH CV ECHO MEAS - AO MEAN PG: 2 MMHG
BH CV ECHO MEAS - AO ROOT AREA (BSA CORRECTED): 1.8
BH CV ECHO MEAS - AO ROOT AREA: 10.8 CM^2
BH CV ECHO MEAS - AO ROOT DIAM: 3.7 CM
BH CV ECHO MEAS - AO V2 MAX: 95 CM/SEC
BH CV ECHO MEAS - AO V2 MEAN: 66.5 CM/SEC
BH CV ECHO MEAS - AO V2 VTI: 24.3 CM
BH CV ECHO MEAS - ASC AORTA: 3.2 CM
BH CV ECHO MEAS - AVA(I,A): 3.7 CM^2
BH CV ECHO MEAS - AVA(I,D): 3.7 CM^2
BH CV ECHO MEAS - BSA(HAYCOCK): 2.1 M^2
BH CV ECHO MEAS - BSA: 2 M^2
BH CV ECHO MEAS - BZI_BMI: 30.6 KILOGRAMS/M^2
BH CV ECHO MEAS - BZI_METRIC_HEIGHT: 172.7 CM
BH CV ECHO MEAS - BZI_METRIC_WEIGHT: 91.2 KG
BH CV ECHO MEAS - CONTRAST EF (2CH): 60.9 ML/M^2
BH CV ECHO MEAS - CONTRAST EF 4CH: 66.7 ML/M^2
BH CV ECHO MEAS - EDV(CUBED): 140.6 ML
BH CV ECHO MEAS - EDV(MOD-SP2): 92 ML
BH CV ECHO MEAS - EDV(MOD-SP4): 126 ML
BH CV ECHO MEAS - EDV(TEICH): 129.5 ML
BH CV ECHO MEAS - EF(CUBED): 84.4 %
BH CV ECHO MEAS - EF(MOD-SP2): 60.9 %
BH CV ECHO MEAS - EF(MOD-SP4): 66.7 %
BH CV ECHO MEAS - EF(TEICH): 77.2 %
BH CV ECHO MEAS - ESV(CUBED): 22 ML
BH CV ECHO MEAS - ESV(MOD-SP2): 36 ML
BH CV ECHO MEAS - ESV(MOD-SP4): 42 ML
BH CV ECHO MEAS - ESV(TEICH): 29.6 ML
BH CV ECHO MEAS - FS: 46.2 %
BH CV ECHO MEAS - IVS/LVPW: 1.1
BH CV ECHO MEAS - IVSD: 1 CM
BH CV ECHO MEAS - LAT PEAK E' VEL: 8 CM/SEC
BH CV ECHO MEAS - LV DIASTOLIC VOL/BSA (35-75): 61.5 ML/M^2
BH CV ECHO MEAS - LV MASS(C)D: 181.4 GRAMS
BH CV ECHO MEAS - LV MASS(C)DI: 88.6 GRAMS/M^2
BH CV ECHO MEAS - LV MEAN PG: 2 MMHG
BH CV ECHO MEAS - LV SYSTOLIC VOL/BSA (12-30): 20.5 ML/M^2
BH CV ECHO MEAS - LV V1 MAX: 103 CM/SEC
BH CV ECHO MEAS - LV V1 MEAN: 58.7 CM/SEC
BH CV ECHO MEAS - LV V1 VTI: 21.6 CM
BH CV ECHO MEAS - LVIDD: 5.2 CM
BH CV ECHO MEAS - LVIDS: 2.8 CM
BH CV ECHO MEAS - LVLD AP2: 7.7 CM
BH CV ECHO MEAS - LVLD AP4: 7.7 CM
BH CV ECHO MEAS - LVLS AP2: 7 CM
BH CV ECHO MEAS - LVLS AP4: 6.1 CM
BH CV ECHO MEAS - LVOT AREA (M): 4.2 CM^2
BH CV ECHO MEAS - LVOT AREA: 4.2 CM^2
BH CV ECHO MEAS - LVOT DIAM: 2.3 CM
BH CV ECHO MEAS - LVPWD: 0.9 CM
BH CV ECHO MEAS - MED PEAK E' VEL: 8 CM/SEC
BH CV ECHO MEAS - MV A DUR: 137 SEC
BH CV ECHO MEAS - MV A MAX VEL: 90.8 CM/SEC
BH CV ECHO MEAS - MV DEC SLOPE: 478 CM/SEC^2
BH CV ECHO MEAS - MV DEC TIME: 187 SEC
BH CV ECHO MEAS - MV E MAX VEL: 66.1 CM/SEC
BH CV ECHO MEAS - MV E/A: 0.73
BH CV ECHO MEAS - MV MEAN PG: 1 MMHG
BH CV ECHO MEAS - MV P1/2T MAX VEL: 93.7 CM/SEC
BH CV ECHO MEAS - MV P1/2T: 57.4 MSEC
BH CV ECHO MEAS - MV V2 MEAN: 53.8 CM/SEC
BH CV ECHO MEAS - MV V2 VTI: 34.2 CM
BH CV ECHO MEAS - MVA P1/2T LCG: 2.3 CM^2
BH CV ECHO MEAS - MVA(P1/2T): 3.8 CM^2
BH CV ECHO MEAS - MVA(VTI): 2.6 CM^2
BH CV ECHO MEAS - PA ACC SLOPE: 13.3 CM/SEC^2
BH CV ECHO MEAS - PA ACC TIME: 0.1 SEC
BH CV ECHO MEAS - PA MAX PG: 3.5 MMHG
BH CV ECHO MEAS - PA PR(ACCEL): 33.1 MMHG
BH CV ECHO MEAS - PA V2 MAX: 93.7 CM/SEC
BH CV ECHO MEAS - PULM A REVS DUR: 130 SEC
BH CV ECHO MEAS - PULM A REVS VEL: 28.1 CM/SEC
BH CV ECHO MEAS - PULM DIAS VEL: 36.8 CM/SEC
BH CV ECHO MEAS - PULM S/D: 2
BH CV ECHO MEAS - PULM SYS VEL: 73.6 CM/SEC
BH CV ECHO MEAS - QP/QS: 0.39
BH CV ECHO MEAS - RV MEAN PG: 1 MMHG
BH CV ECHO MEAS - RV V1 MEAN: 38.5 CM/SEC
BH CV ECHO MEAS - RV V1 VTI: 15.5 CM
BH CV ECHO MEAS - RVOT AREA: 2.3 CM^2
BH CV ECHO MEAS - RVOT DIAM: 1.7 CM
BH CV ECHO MEAS - SI(AO): 127.6 ML/M^2
BH CV ECHO MEAS - SI(CUBED): 57.9 ML/M^2
BH CV ECHO MEAS - SI(LVOT): 43.8 ML/M^2
BH CV ECHO MEAS - SI(MOD-SP2): 27.3 ML/M^2
BH CV ECHO MEAS - SI(MOD-SP4): 41 ML/M^2
BH CV ECHO MEAS - SI(TEICH): 48.8 ML/M^2
BH CV ECHO MEAS - SV(AO): 261.3 ML
BH CV ECHO MEAS - SV(CUBED): 118.7 ML
BH CV ECHO MEAS - SV(LVOT): 89.7 ML
BH CV ECHO MEAS - SV(MOD-SP2): 56 ML
BH CV ECHO MEAS - SV(MOD-SP4): 84 ML
BH CV ECHO MEAS - SV(RVOT): 35.2 ML
BH CV ECHO MEAS - SV(TEICH): 100 ML
BH CV ECHO MEAS - TAPSE (>1.6): 2.4 CM2
BH CV XLRA - RV BASE: 3.7 CM
BH CV XLRA - TDI S': 14 CM/SEC
BILIRUB SERPL-MCNC: 0.9 MG/DL (ref 0.1–1.2)
BUN BLD-MCNC: 15 MG/DL (ref 8–23)
BUN/CREAT SERPL: 14.9 (ref 7–25)
CALCIUM SPEC-SCNC: 8.5 MG/DL (ref 8.6–10.5)
CHLORIDE SERPL-SCNC: 103 MMOL/L (ref 98–107)
CO2 SERPL-SCNC: 21.5 MMOL/L (ref 22–29)
CREAT BLD-MCNC: 1.01 MG/DL (ref 0.76–1.27)
DEPRECATED RDW RBC AUTO: 46.5 FL (ref 37–54)
E/E' RATIO: 9
EOSINOPHIL # BLD AUTO: 0.07 10*3/MM3 (ref 0–0.7)
EOSINOPHIL NFR BLD AUTO: 0.7 % (ref 0.3–6.2)
ERYTHROCYTE [DISTWIDTH] IN BLOOD BY AUTOMATED COUNT: 14.5 % (ref 11.5–14.5)
GFR SERPL CREATININE-BSD FRML MDRD: 74 ML/MIN/1.73
GLOBULIN UR ELPH-MCNC: 2.9 GM/DL
GLUCOSE BLD-MCNC: 102 MG/DL (ref 65–99)
HCT VFR BLD AUTO: 44.1 % (ref 40.4–52.2)
HGB BLD-MCNC: 14.6 G/DL (ref 13.7–17.6)
IMM GRANULOCYTES # BLD: 0.02 10*3/MM3 (ref 0–0.03)
IMM GRANULOCYTES NFR BLD: 0.2 % (ref 0–0.5)
LEFT ATRIUM VOLUME INDEX: 40.5 ML/M2
LYMPHOCYTES # BLD AUTO: 0.87 10*3/MM3 (ref 0.9–4.8)
LYMPHOCYTES NFR BLD AUTO: 9 % (ref 19.6–45.3)
MCH RBC QN AUTO: 29.4 PG (ref 27–32.7)
MCHC RBC AUTO-ENTMCNC: 33.1 G/DL (ref 32.6–36.4)
MCV RBC AUTO: 88.7 FL (ref 79.8–96.2)
MONOCYTES # BLD AUTO: 0.83 10*3/MM3 (ref 0.2–1.2)
MONOCYTES NFR BLD AUTO: 8.6 % (ref 5–12)
NEUTROPHILS # BLD AUTO: 7.85 10*3/MM3 (ref 1.9–8.1)
NEUTROPHILS NFR BLD AUTO: 81.2 % (ref 42.7–76)
PLATELET # BLD AUTO: 183 10*3/MM3 (ref 140–500)
PMV BLD AUTO: 11.2 FL (ref 6–12)
POTASSIUM BLD-SCNC: 4.5 MMOL/L (ref 3.5–5.2)
PROT SERPL-MCNC: 6.5 G/DL (ref 6–8.5)
RBC # BLD AUTO: 4.97 10*6/MM3 (ref 4.6–6)
SODIUM BLD-SCNC: 138 MMOL/L (ref 136–145)
WBC NRBC COR # BLD: 9.67 10*3/MM3 (ref 4.5–10.7)

## 2017-03-12 PROCEDURE — 80053 COMPREHEN METABOLIC PANEL: CPT | Performed by: HOSPITALIST

## 2017-03-12 PROCEDURE — 25810000003 SODIUM CHLORIDE 0.9 % WITH KCL 20 MEQ 20-0.9 MEQ/L-% SOLUTION: Performed by: HOSPITALIST

## 2017-03-12 PROCEDURE — 85025 COMPLETE CBC W/AUTO DIFF WBC: CPT | Performed by: HOSPITALIST

## 2017-03-12 PROCEDURE — 99231 SBSQ HOSP IP/OBS SF/LOW 25: CPT | Performed by: INTERNAL MEDICINE

## 2017-03-12 PROCEDURE — 25010000002 HYDRALAZINE PER 20 MG: Performed by: HOSPITALIST

## 2017-03-12 RX ORDER — HYDRALAZINE HYDROCHLORIDE 25 MG/1
25 TABLET, FILM COATED ORAL EVERY 8 HOURS SCHEDULED
Status: DISCONTINUED | OUTPATIENT
Start: 2017-03-12 | End: 2017-03-13 | Stop reason: HOSPADM

## 2017-03-12 RX ADMIN — METOPROLOL TARTRATE 25 MG: 25 TABLET ORAL at 19:50

## 2017-03-12 RX ADMIN — AMLODIPINE BESYLATE 10 MG: 10 TABLET ORAL at 08:29

## 2017-03-12 RX ADMIN — PANTOPRAZOLE SODIUM 40 MG: 40 TABLET, DELAYED RELEASE ORAL at 07:30

## 2017-03-12 RX ADMIN — LEFLUNOMIDE 10 MG: 10 TABLET ORAL at 08:30

## 2017-03-12 RX ADMIN — HYDRALAZINE HYDROCHLORIDE 10 MG: 20 INJECTION INTRAMUSCULAR; INTRAVENOUS at 14:14

## 2017-03-12 RX ADMIN — ASPIRIN 81 MG: 81 TABLET, CHEWABLE ORAL at 08:29

## 2017-03-12 RX ADMIN — OXYCODONE HYDROCHLORIDE AND ACETAMINOPHEN 1 TABLET: 5; 325 TABLET ORAL at 08:35

## 2017-03-12 RX ADMIN — HYDRALAZINE HYDROCHLORIDE 25 MG: 25 TABLET ORAL at 17:45

## 2017-03-12 RX ADMIN — ATORVASTATIN CALCIUM 10 MG: 10 TABLET, FILM COATED ORAL at 19:49

## 2017-03-12 RX ADMIN — HYDRALAZINE HYDROCHLORIDE 10 MG: 20 INJECTION INTRAMUSCULAR; INTRAVENOUS at 23:20

## 2017-03-12 RX ADMIN — METOPROLOL TARTRATE 25 MG: 25 TABLET ORAL at 08:29

## 2017-03-12 RX ADMIN — PANTOPRAZOLE SODIUM 40 MG: 40 TABLET, DELAYED RELEASE ORAL at 17:45

## 2017-03-12 RX ADMIN — HYDRALAZINE HYDROCHLORIDE 10 MG: 20 INJECTION INTRAMUSCULAR; INTRAVENOUS at 01:03

## 2017-03-12 RX ADMIN — POTASSIUM CHLORIDE AND SODIUM CHLORIDE 75 ML/HR: 900; 150 INJECTION, SOLUTION INTRAVENOUS at 01:02

## 2017-03-12 NOTE — PROGRESS NOTES
" DAILY PROGRESS NOTE      CHIEF COMPLAINT:    DOING BETTER  NO NEW COMPLAINTS    HISTORY:  A 66-year-old white male with no significant past medical history, except for osteoarthritis/rheumatoid arthritis, who has some vision problem; it has been followed by ophthalmology, as he described floater that moves in the right eye with some mild headache. Ended up in ER; workup in ER reveals very high blood pressure with hypertensive urgency; admitted for management. Patient denies any chest pain, but he does have some headache, he does have some weakness, and he still has problem with right eye which has been followed by his ophthalmologist. No trauma, injury, or fall.     PHYSICAL EXAMINATION:Blood pressure 179/93, pulse 68, temperature 97.7 °F (36.5 °C), temperature source Oral, resp. rate 18, height 68\" (172.7 cm), weight 198 lb 6 oz (90 kg), SpO2 96 %.    GENERAL: Very pleasant, cooperative white male; no respiratory distress.   HEENT: Unremarkable, except for his right vision issue which is followed by ophthalmology.   NECK: Supple.   LUNGS: Clear.   HEART: S1, S2.   ABDOMEN: Soft, nontender. Bowel sounds positive.   EXTREMITIES: No edema.   NEUROLOGICAL: No focal deficit.     DIAGNOSTIC DATA:    Lab Results (last 24 hours)     Procedure Component Value Units Date/Time    C-reactive Protein [[id number]]  (Abnormal) Collected:  03/11/17 1826    Specimen:  Blood Updated:  03/11/17 1941     C-Reactive Protein 0.60 (H) mg/dL     Sedimentation Rate [[id number]]  (Normal) Collected:  03/11/17 1826    Specimen:  Blood Updated:  03/11/17 1953     Sed Rate 6 mm/hr     CBC Auto Differential [[id number]]  (Abnormal) Collected:  03/12/17 0615    Specimen:  Blood Updated:  03/12/17 0701     WBC 9.67 10*3/mm3      RBC 4.97 10*6/mm3      Hemoglobin 14.6 g/dL      Hematocrit 44.1 %      MCV 88.7 fL      MCH 29.4 pg      MCHC 33.1 g/dL      RDW 14.5 %      RDW-SD 46.5 fl      MPV 11.2 fL      Platelets 183 10*3/mm3      Neutrophil % 81.2 " (H) %      Lymphocyte % 9.0 (L) %      Monocyte % 8.6 %      Eosinophil % 0.7 %      Basophil % 0.3 %      Immature Grans % 0.2 %      Neutrophils, Absolute 7.85 10*3/mm3      Lymphocytes, Absolute 0.87 (L) 10*3/mm3      Monocytes, Absolute 0.83 10*3/mm3      Eosinophils, Absolute 0.07 10*3/mm3      Basophils, Absolute 0.03 10*3/mm3      Immature Grans, Absolute 0.02 10*3/mm3     CBC & Differential [[id number]] Collected:  03/12/17 0615    Specimen:  Blood Updated:  03/12/17 0701    Narrative:       The following orders were created for panel order CBC & Differential.  Procedure                               Abnormality         Status                     ---------                               -----------         ------                     CBC Auto Differential[[id number]]         Abnormal            Final result                 Please view results for these tests on the individual orders.    Comprehensive Metabolic Panel [[id number]]  (Abnormal) Collected:  03/12/17 0615    Specimen:  Blood Updated:  03/12/17 0741     Glucose 102 (H) mg/dL      BUN 15 mg/dL      Creatinine 1.01 mg/dL      Sodium 138 mmol/L      Potassium 4.5 mmol/L      Chloride 103 mmol/L      CO2 21.5 (L) mmol/L      Calcium 8.5 (L) mg/dL      Total Protein 6.5 g/dL      Albumin 3.60 g/dL      ALT (SGPT) 11 U/L      AST (SGOT) 15 U/L       Specimen hemolyzed.  Results may be affected.        Alkaline Phosphatase 61 U/L      Total Bilirubin 0.9 mg/dL      eGFR Non African Amer 74 mL/min/1.73      Globulin 2.9 gm/dL      A/G Ratio 1.2 g/dL      BUN/Creatinine Ratio 14.9      Anion Gap 13.5 mmol/L       BUN 19, creatinine 1.56, GFR of 45, potassium 4.2. INR okay. CBC okay. CT of the head is unremarkable. EKG showed sinus rhythm with nonspecific ST-T wave changes; no old EKG for comparison. He does have chronic bradycardia; he has had this for a long time.   Imaging Results (last 72 hours)     Procedure Component Value Units Date/Time    CT Head Without  Contrast [[id number]] Collected:  03/09/17 1333     Updated:  03/09/17 1354    Narrative:       CT SCAN HEAD WITHOUT CONTRAST     CLINICAL HISTORY: Headache and hypertension.     CT scan of the head was obtained with 3 mm axial images. No intravenous  contrast was administered.     Comparison is made to prior MRI of the brain dated 04/21/2010.     FINDINGS:     The ventricles, sulci, and cisterns are age appropriate. The basal  ganglia and thalami are unremarkable in appearance. The posterior fossa  structures are within normal limits. Incidental note is made of cavum  septum pellucidum et vergae. Incidental note is made of either a pal  cisterna magna or a arachnoid cyst posterior to the cerebellar  hemispheres. This is unchanged since previous imaging.       Impression:          No evidence for acute intracranial pathology.     This report was finalized on 3/9/2017 1:51 PM by Dr. Daniel Dyer MD.           Current Facility-Administered Medications:   •  acetaminophen (TYLENOL) tablet 650 mg, 650 mg, Oral, Q4H PRN, Newton Burger MD, 650 mg at 03/10/17 0530  •  amLODIPine (NORVASC) tablet 10 mg, 10 mg, Oral, Daily, Newton Burger MD, 10 mg at 03/12/17 0829  •  aspirin chewable tablet 81 mg, 81 mg, Oral, Daily, Newton Burger MD, 81 mg at 03/12/17 0829  •  atorvastatin (LIPITOR) tablet 10 mg, 10 mg, Oral, Nightly, Newton Burger MD, 10 mg at 03/11/17 2021  •  hydrALAZINE (APRESOLINE) injection 10 mg, 10 mg, Intravenous, Q4H PRN, Newton Burger MD, 10 mg at 03/12/17 1414  •  leflunomide (ARAVA) tablet 10 mg, 10 mg, Oral, Daily, Newton Burger MD, 10 mg at 03/12/17 0830  •  metoprolol tartrate (LOPRESSOR) tablet 25 mg, 25 mg, Oral, Q12H, Andreea M Strain, APRN, 25 mg at 03/12/17 0829  •  ondansetron (ZOFRAN) injection 4 mg, 4 mg, Intravenous, Q4H PRN, Newton Burger MD, 4 mg at 03/10/17 1702  •  oxyCODONE-acetaminophen (PERCOCET) 5-325 MG per tablet 1 tablet, 1 tablet, Oral, Q6H PRN, Meir Mcduffie MD, 1 tablet at 03/12/17  0835  •  pantoprazole (PROTONIX) EC tablet 40 mg, 40 mg, Oral, BID AC, Newton Burger MD, 40 mg at 03/12/17 2339  •  Insert peripheral IV, , , Once **AND** sodium chloride 0.9 % flush 10 mL, 10 mL, Intravenous, PRN, Rebecca Skaggs MD  •  sodium chloride 0.9 % with KCl 20 mEq/L infusion, 75 mL/hr, Intravenous, Continuous, Newton Burger MD, Last Rate: 75 mL/hr at 03/12/17 0102, 75 mL/hr at 03/12/17 0102     STRESS TEST NEGATIVE  Imaging Results (last 72 hours)     Procedure Component Value Units Date/Time    MRI Brain With & Without Contrast [[id number]] Collected:  03/11/17 1612     Updated:  03/11/17 1641    Narrative:       MRI BRAIN WITH AND WITHOUT CONTRAST     HISTORY: Headache in the bilateral temporal and frontal region for 3  days.     TECHNIQUE: Brain MRI includes sagittal T1 as well as axial diffusion,  FLAIR, T1, T2-weighted sequences. Gadolinium administered intravenously  followed by axial and coronal T1-weighted sequences.     COMPARISON: Whole brain MRI with and without contrast 04/21/2010.     FINDINGS: There are no abnormal foci of restricted diffusion to diagnose  an acute infarction. Mild patchy cerebral white matter foci of FLAIR  hyperintense signal are present which are nonspecific though consistent  with chronic small vessel ischemic change. There is cavum septum  pellucidum and cavum et vergae. There is also prominent CSF collection  posterior to the cerebellar vermis favored to represent pal cisterna  magnum and these findings exhibit no change when compared to exam  04/21/2010. There is no abnormal intracranial enhancement. Major  intracranial flow voids appear within normal limits.       Impression:       Minor chronic small vessel ischemic white matter change. No  infarction or evidence for acute intracranial abnormality. No evidence  for significant change when compared to previous brain MRI 04/21/2010.     This report was finalized on 3/11/2017 4:38 PM by Dr. Shaun Hurst MD.              ASSESSMENT:  1.  Hypertensive urgency.   2.  Right eye problem, per Ophthalmology.   3.  HEADACHE RESOLVED  4.  Rheumatoid arthritis.   5.  Osteoarthritis.     PLAN:  1.  CPM  2.  NORVASC   3.  Echocardiogram. /MRI WNL  4.  HOLD DISCHARGE  5.  STRESS TEST NEGATVE  6.  ADJUST MEDS  7.  D/C IN AM      Newton Burger M.D.

## 2017-03-12 NOTE — PROGRESS NOTES
[facility]  Cardiology Progress Note    Patient Identification:  Name: Richar Brito  Age: 66 y.o.  Sex: male  :  1950  MRN: [id number]                 Follow Up / Chief Complaint: Hypertensive urgency, dyslipidemia    Interval History:  He denies history of MI, arrhythmia, congestive heart failure or previous ischemic workup. He was sent to the emergency room after he was noted to have markedly elevated blood pressure. He received a dose of clonidine upon arrival for blood pressure 237/114. He denies any history of chest pain, pressure, tightness, palpitations, dizziness, lightheadedness, shortness of breath, PND, orthopnea, nausea/vomiting, weakness, near syncope or syncope.     Subjective:    No cp, palpitation    Headaches are better      Objective:  BP labile today, however still has mild headache.  MRI of brain done last evening shows no infarction or evidence of acute intracranial abnormalities and no significant change when compared to study  2010    Past Medical History:  Past Medical History   Diagnosis Date   • Arthritis      Past Surgical History:  Past Surgical History   Procedure Laterality Date   • Hernia repair          Social History:   Social History   Substance Use Topics   • Smoking status: Never Smoker   • Smokeless tobacco: Not on file   • Alcohol use No      Family History:  History reviewed. No pertinent family history.       Allergies:  No Known Allergies  Scheduled Meds:    amLODIPine 10 mg Daily   aspirin 81 mg Daily   atorvastatin 10 mg Nightly   hydrALAZINE 25 mg Q8H   leflunomide 10 mg Daily   metoprolol tartrate 25 mg Q12H   pantoprazole 40 mg BID AC           INTAKE AND OUTPUT:    Intake/Output Summary (Last 24 hours) at 17 1501  Last data filed at 17 0849   Gross per 24 hour   Intake   1470 ml   Output      0 ml   Net   1470 ml        Review of Systems:   GI: +Nausea and vomiting this a.m.  Cardiac: No chest pain, tightness  Pulmonary: No shortness  "of breath or cough    Constitutional:  Temp:  [97.3 °F (36.3 °C)-98.3 °F (36.8 °C)] 97.7 °F (36.5 °C)  Heart Rate:  [59-68] 68  Resp:  [16-18] 18  BP: (161-185)/(83-93) 179/93    Physical Exam by Dr Mendez             Physical Exam  Visit Vitals   • /92 (BP Location: Right arm, Patient Position: Sitting)   • Pulse 69   • Temp 97.7 °F (36.5 °C) (Oral)   • Resp 18   • Ht 68\" (172.7 cm)   • Wt 198 lb 6 oz (90 kg)   • SpO2 96%   • BMI 30.16 kg/m2       General appearance: NAD, conversant   Eyes: anicteric sclerae, moist conjunctivae; no lid-lag; PERRLA   HENT: Atraumatic; oropharynx clear with moist mucous membranes and no mucosal ulcerations;  normal hard and soft palate   Neck: Trachea midline; FROM, supple, no thyromegaly or lymphadenopathy   Lungs: CTA, with normal respiratory effort and no intercostal retractions   CV: S1-S2 regular, no murmurs, no rub, no gallop   Abdomen: Soft, non-tender; no masses or HSM   Extremities: No peripheral edema or extremity lymphadenopathy  Skin: Normal temperature, turgor and texture; no rash, ulcers or subcutaneous nodules   Psych: Appropriate affect, alert and oriented to person, place and time             Cardiographics  Telemetry: SR  60's, no ectopy        Echocardiogram:  · SALINE TEST NEG FOR PFO  · Left Ventricle: Calculated EF = 66.7%.  · Trace-to-mild mitral valve regurgitation  · There is no evidence of pericardial effusion    Lab Review     Results from last 7 days  Lab Units 03/12/17  0615   SODIUM mmol/L 138   POTASSIUM mmol/L 4.5   BUN mg/dL 15   CREATININE mg/dL 1.01   CALCIUM mg/dL 8.5*       Assessment:  - Hypertensive urgency  - OD vision changes  - Renal insufficiency  - Rheumatoid arthritis  - Dyslipidemia      Plan:  - Hypertensive urgency - improved control on Norvasc and Lopressor. MI ruled out. Stress test was negative for ischemia    - Dyslipidemia - statin therapy started.  Patient states he is working with PCP on non-statin management " strategies      Continue current Lopressor and Norvasc doses.  Start statin.  Repeat LFTs and lipid panel recommended in 8-12 weeks Review echo once completed and stress test      I reviewed the patient's new clinical results and treatment plan. I personally viewed and interpreted the patient's EKG/Telemetry data    )3/12/2017  Gerson Mendez MD      EMR Dragon/Transcription disclaimer:   Much of this encounter note is an electronic transcription/translation of spoken language to printed text. The electronic translation of spoken language may permit erroneous, or at times, nonsensical words or phrases to be inadvertently transcribed; Although I have reviewed the note for such errors, some may still exist.

## 2017-03-12 NOTE — PLAN OF CARE
Problem: Patient Care Overview (Adult)  Goal: Plan of Care Review  Outcome: Ongoing (interventions implemented as appropriate)    03/12/17 0153   Coping/Psychosocial Response Interventions   Plan Of Care Reviewed With patient   Patient Care Overview   Progress no change   Outcome Evaluation   Outcome Summary/Follow up Plan Pt denies any n/v.Pt continue to have inc blood pressure.PRN IV Hydralazine given x2.Will continue to monitor.       Goal: Adult Individualization and Mutuality  Outcome: Ongoing (interventions implemented as appropriate)    Problem: Hypertensive Disease/Crisis (Arterial) (Adult)  Goal: Signs and Symptoms of Listed Potential Problems Will be Absent or Manageable (Hypertensive Disease/Crisis)  Outcome: Ongoing (interventions implemented as appropriate)    Problem: Pain, Acute (Adult)  Goal: Acceptable Pain Control/Comfort Level  Outcome: Ongoing (interventions implemented as appropriate)

## 2017-03-12 NOTE — PLAN OF CARE
Problem: Patient Care Overview (Adult)  Goal: Plan of Care Review  Outcome: Ongoing (interventions implemented as appropriate)    03/12/17 4306   Coping/Psychosocial Response Interventions   Plan Of Care Reviewed With patient   Patient Care Overview   Progress improving   Outcome Evaluation   Outcome Summary/Follow up Plan Patient c/o headache this AM. Lortab given. Patient had complete relief. BP remains high, new BP medication ordered. Will continue to monitor.         Problem: Hypertensive Disease/Crisis (Arterial) (Adult)  Goal: Signs and Symptoms of Listed Potential Problems Will be Absent or Manageable (Hypertensive Disease/Crisis)  Outcome: Ongoing (interventions implemented as appropriate)    Problem: Pain, Acute (Adult)  Goal: Acceptable Pain Control/Comfort Level  Outcome: Ongoing (interventions implemented as appropriate)

## 2017-03-13 VITALS
DIASTOLIC BLOOD PRESSURE: 81 MMHG | TEMPERATURE: 98.3 F | WEIGHT: 197.13 LBS | RESPIRATION RATE: 18 BRPM | HEART RATE: 75 BPM | OXYGEN SATURATION: 96 % | HEIGHT: 68 IN | BODY MASS INDEX: 29.88 KG/M2 | SYSTOLIC BLOOD PRESSURE: 128 MMHG

## 2017-03-13 LAB
ANION GAP SERPL CALCULATED.3IONS-SCNC: 12.4 MMOL/L
BUN BLD-MCNC: 16 MG/DL (ref 8–23)
BUN/CREAT SERPL: 17.6 (ref 7–25)
CALCIUM SPEC-SCNC: 8.8 MG/DL (ref 8.6–10.5)
CHLORIDE SERPL-SCNC: 99 MMOL/L (ref 98–107)
CO2 SERPL-SCNC: 23.6 MMOL/L (ref 22–29)
CREAT BLD-MCNC: 0.91 MG/DL (ref 0.76–1.27)
GFR SERPL CREATININE-BSD FRML MDRD: 83 ML/MIN/1.73
GLUCOSE BLD-MCNC: 92 MG/DL (ref 65–99)
POTASSIUM BLD-SCNC: 3.8 MMOL/L (ref 3.5–5.2)
SODIUM BLD-SCNC: 135 MMOL/L (ref 136–145)

## 2017-03-13 PROCEDURE — 80048 BASIC METABOLIC PNL TOTAL CA: CPT | Performed by: HOSPITALIST

## 2017-03-13 PROCEDURE — 99231 SBSQ HOSP IP/OBS SF/LOW 25: CPT | Performed by: INTERNAL MEDICINE

## 2017-03-13 RX ORDER — HYDRALAZINE HYDROCHLORIDE 25 MG/1
25 TABLET, FILM COATED ORAL EVERY 8 HOURS SCHEDULED
Qty: 90 TABLET | Refills: 0 | Status: SHIPPED | OUTPATIENT
Start: 2017-03-13 | End: 2017-04-12

## 2017-03-13 RX ORDER — PANTOPRAZOLE SODIUM 40 MG/1
40 TABLET, DELAYED RELEASE ORAL
Qty: 60 TABLET | Refills: 0 | Status: SHIPPED | OUTPATIENT
Start: 2017-03-13 | End: 2017-04-12

## 2017-03-13 RX ORDER — AMLODIPINE BESYLATE 10 MG/1
10 TABLET ORAL DAILY
Qty: 30 TABLET | Refills: 0 | Status: SHIPPED | OUTPATIENT
Start: 2017-03-13 | End: 2017-04-12

## 2017-03-13 RX ORDER — ATORVASTATIN CALCIUM 10 MG/1
10 TABLET, FILM COATED ORAL NIGHTLY
Qty: 30 TABLET | Refills: 0 | Status: SHIPPED | OUTPATIENT
Start: 2017-03-13 | End: 2017-04-12

## 2017-03-13 RX ADMIN — PANTOPRAZOLE SODIUM 40 MG: 40 TABLET, DELAYED RELEASE ORAL at 08:42

## 2017-03-13 RX ADMIN — LEFLUNOMIDE 10 MG: 10 TABLET ORAL at 08:43

## 2017-03-13 RX ADMIN — ASPIRIN 81 MG: 81 TABLET, CHEWABLE ORAL at 08:42

## 2017-03-13 RX ADMIN — METOPROLOL TARTRATE 25 MG: 25 TABLET ORAL at 08:42

## 2017-03-13 RX ADMIN — HYDRALAZINE HYDROCHLORIDE 25 MG: 25 TABLET ORAL at 13:32

## 2017-03-13 RX ADMIN — AMLODIPINE BESYLATE 10 MG: 10 TABLET ORAL at 08:42

## 2017-03-13 RX ADMIN — HYDRALAZINE HYDROCHLORIDE 25 MG: 25 TABLET ORAL at 02:55

## 2017-03-13 RX ADMIN — HYDRALAZINE HYDROCHLORIDE 25 MG: 25 TABLET ORAL at 08:42

## 2017-03-13 NOTE — PLAN OF CARE
Problem: Patient Care Overview (Adult)  Goal: Plan of Care Review  Outcome: Ongoing (interventions implemented as appropriate)    03/13/17 0306   Coping/Psychosocial Response Interventions   Plan Of Care Reviewed With patient   Patient Care Overview   Progress improving   Outcome Evaluation   Outcome Summary/Follow up Plan blood pressure slowly improving; PRN hydralazine given x1 dose. denies pain. will continue to monitor. await MD orders.         Problem: Hypertensive Disease/Crisis (Arterial) (Adult)  Goal: Signs and Symptoms of Listed Potential Problems Will be Absent or Manageable (Hypertensive Disease/Crisis)  Outcome: Ongoing (interventions implemented as appropriate)    Problem: Pain, Acute (Adult)  Goal: Acceptable Pain Control/Comfort Level  Outcome: Ongoing (interventions implemented as appropriate)

## 2017-03-13 NOTE — PLAN OF CARE
Problem: Patient Care Overview (Adult)  Goal: Plan of Care Review  Outcome: Ongoing (interventions implemented as appropriate)    03/13/17 1325   Coping/Psychosocial Response Interventions   Plan Of Care Reviewed With patient   Patient Care Overview   Progress improving   Outcome Evaluation   Outcome Summary/Follow up Plan BP improving with meds. possible d/c home today.

## 2017-03-13 NOTE — PROGRESS NOTES
" DAILY PROGRESS NOTE      CHIEF COMPLAINT:    DOING BETTER  NO NEW COMPLAINTS    HISTORY:  A 66-year-old white male with no significant past medical history, except for osteoarthritis/rheumatoid arthritis, who has some vision problem; it has been followed by ophthalmology, as he described floater that moves in the right eye with some mild headache. Ended up in ER; workup in ER reveals very high blood pressure with hypertensive urgency; admitted for management. Patient denies any chest pain, but he does have some headache, he does have some weakness, and he still has problem with right eye which has been followed by his ophthalmologist. No trauma, injury, or fall.     PHYSICAL EXAMINATION:Blood pressure 128/81, pulse 75, temperature 98.3 °F (36.8 °C), temperature source Oral, resp. rate 18, height 68\" (172.7 cm), weight 197 lb 2 oz (89.4 kg), SpO2 96 %.    GENERAL: Very pleasant, cooperative white male; no respiratory distress.   HEENT: Unremarkable, except for his right vision issue which is followed by ophthalmology.   NECK: Supple.   LUNGS: Clear.   HEART: S1, S2.   ABDOMEN: Soft, nontender. Bowel sounds positive.   EXTREMITIES: No edema.   NEUROLOGICAL: No focal deficit.     DIAGNOSTIC DATA:    Lab Results (last 24 hours)     Procedure Component Value Units Date/Time    Basic Metabolic Panel [[id number]]  (Abnormal) Collected:  03/13/17 0516    Specimen:  Blood Updated:  03/13/17 0633     Glucose 92 mg/dL      BUN 16 mg/dL      Creatinine 0.91 mg/dL      Sodium 135 (L) mmol/L      Potassium 3.8 mmol/L      Chloride 99 mmol/L      CO2 23.6 mmol/L      Calcium 8.8 mg/dL      eGFR Non African Amer 83 mL/min/1.73      BUN/Creatinine Ratio 17.6      Anion Gap 12.4 mmol/L     Narrative:       GFR Normal >60  Chronic Kidney Disease <60  Kidney Failure <15      BUN 19, creatinine 1.56, GFR of 45, potassium 4.2. INR okay. CBC okay. CT of the head is unremarkable. EKG showed sinus rhythm with nonspecific ST-T wave changes; " no old EKG for comparison. He does have chronic bradycardia; he has had this for a long time.   Imaging Results (last 72 hours)     Procedure Component Value Units Date/Time    CT Head Without Contrast [[id number]] Collected:  03/09/17 1333     Updated:  03/09/17 1354    Narrative:       CT SCAN HEAD WITHOUT CONTRAST     CLINICAL HISTORY: Headache and hypertension.     CT scan of the head was obtained with 3 mm axial images. No intravenous  contrast was administered.     Comparison is made to prior MRI of the brain dated 04/21/2010.     FINDINGS:     The ventricles, sulci, and cisterns are age appropriate. The basal  ganglia and thalami are unremarkable in appearance. The posterior fossa  structures are within normal limits. Incidental note is made of cavum  septum pellucidum et vergae. Incidental note is made of either a pal  cisterna magna or a arachnoid cyst posterior to the cerebellar  hemispheres. This is unchanged since previous imaging.       Impression:          No evidence for acute intracranial pathology.     This report was finalized on 3/9/2017 1:51 PM by Dr. Daniel Dyer MD.           Current Facility-Administered Medications:   •  amLODIPine (NORVASC) tablet 10 mg, 10 mg, Oral, Daily, Newton Burger MD, 10 mg at 03/13/17 0842  •  aspirin chewable tablet 81 mg, 81 mg, Oral, Daily, Newton Burger MD, 81 mg at 03/13/17 0842  •  atorvastatin (LIPITOR) tablet 10 mg, 10 mg, Oral, Nightly, Newton Burger MD, 10 mg at 03/12/17 1949  •  hydrALAZINE (APRESOLINE) injection 10 mg, 10 mg, Intravenous, Q4H PRN, Newton Burger MD, 10 mg at 03/12/17 2320  •  hydrALAZINE (APRESOLINE) tablet 25 mg, 25 mg, Oral, Q8H, Newton Burger MD, 25 mg at 03/13/17 1332  •  leflunomide (ARAVA) tablet 10 mg, 10 mg, Oral, Daily, Newton Burger MD, 10 mg at 03/13/17 0843  •  metoprolol tartrate (LOPRESSOR) tablet 25 mg, 25 mg, Oral, Q12H, Andreea Dumont APRN, 25 mg at 03/13/17 0842  •  ondansetron (ZOFRAN) injection 4 mg, 4 mg, Intravenous, Q4H  PRN, Newton Burger MD, 4 mg at 03/10/17 1702  •  oxyCODONE-acetaminophen (PERCOCET) 5-325 MG per tablet 1 tablet, 1 tablet, Oral, Q6H PRN, Meir Mcduffie MD, 1 tablet at 03/12/17 0835  •  pantoprazole (PROTONIX) EC tablet 40 mg, 40 mg, Oral, BID AC, Newton Burger MD, 40 mg at 03/13/17 0842  •  Insert peripheral IV, , , Once **AND** sodium chloride 0.9 % flush 10 mL, 10 mL, Intravenous, PRN, Rebecca Skaggs MD     STRESS TEST NEGATIVE  Imaging Results (last 72 hours)     Procedure Component Value Units Date/Time    MRI Brain With & Without Contrast [[id number]] Collected:  03/11/17 1612     Updated:  03/11/17 1641    Narrative:       MRI BRAIN WITH AND WITHOUT CONTRAST     HISTORY: Headache in the bilateral temporal and frontal region for 3  days.     TECHNIQUE: Brain MRI includes sagittal T1 as well as axial diffusion,  FLAIR, T1, T2-weighted sequences. Gadolinium administered intravenously  followed by axial and coronal T1-weighted sequences.     COMPARISON: Whole brain MRI with and without contrast 04/21/2010.     FINDINGS: There are no abnormal foci of restricted diffusion to diagnose  an acute infarction. Mild patchy cerebral white matter foci of FLAIR  hyperintense signal are present which are nonspecific though consistent  with chronic small vessel ischemic change. There is cavum septum  pellucidum and cavum et vergae. There is also prominent CSF collection  posterior to the cerebellar vermis favored to represent pal cisterna  magnum and these findings exhibit no change when compared to exam  04/21/2010. There is no abnormal intracranial enhancement. Major  intracranial flow voids appear within normal limits.       Impression:       Minor chronic small vessel ischemic white matter change. No  infarction or evidence for acute intracranial abnormality. No evidence  for significant change when compared to previous brain MRI 04/21/2010.     This report was finalized on 3/11/2017 4:38 PM by Dr. Dunn  MD Ar[place].             ASSESSMENT:  1.  Hypertensive urgency.   2.  Right eye problem, per Ophthalmology.   3.  HEADACHE RESOLVED  4.  Rheumatoid arthritis.   5.  Osteoarthritis.     PLAN:  DISCHARGE  SUMMARY DICTATED      Newton Burger M.D.

## 2017-03-13 NOTE — PROGRESS NOTES
"[facility]  Cardiology Progress Note    Patient Identification:  Name: Richar Brito  Age: 66 y.o.  Sex: male  :  1950  MRN: [id number]                 Follow Up / Chief Complaint: Hypertensive urgency, dyslipidemia    Interval History:  He denies history of MI, arrhythmia, congestive heart failure or previous ischemic workup. He was sent to the emergency room after he was noted to have markedly elevated blood pressure. He received a dose of clonidine upon arrival for blood pressure 237/114. He denies any history of chest pain, pressure, tightness, palpitations, dizziness, lightheadedness, shortness of breath, PND, orthopnea, nausea/vomiting, weakness, near syncope or syncope.     Subjective:  Headache \"gone\".  Denies chest pain, tightness, palpitations.  Anticipating discharge soon.  Reviewed need for follow-up, changes to home medications.  Appointment card has been provided.  Patient/spouse were advised to return to the emergency room for any recurrent symptoms and call our office in the interim for any questions or concerns.  All questions were answered.  They both voice understanding      Objective:  Improved BP control.  No ectopy on telemetry        Past Medical History:  Past Medical History   Diagnosis Date   • Arthritis      Past Surgical History:  Past Surgical History   Procedure Laterality Date   • Hernia repair          Social History:   Social History   Substance Use Topics   • Smoking status: Never Smoker   • Smokeless tobacco: Not on file   • Alcohol use No      Family History:  History reviewed. No pertinent family history.       Allergies:  No Known Allergies  Scheduled Meds:    amLODIPine 10 mg Daily   aspirin 81 mg Daily   atorvastatin 10 mg Nightly   hydrALAZINE 25 mg Q8H   leflunomide 10 mg Daily   metoprolol tartrate 25 mg Q12H   pantoprazole 40 mg BID AC           INTAKE AND OUTPUT:  No intake or output data in the 24 hours ending 17 1126     Review of Systems: "   GI: n/v resolved  Cardiac: No chest pain, tightness  Pulmonary: No shortness of breath or cough    Constitutional:  Temp:  [97.3 °F (36.3 °C)-98.1 °F (36.7 °C)] 98.1 °F (36.7 °C)  Heart Rate:  [] 68  Resp:  [18] 18  BP: (137-210)/() 162/90    Physical exam by Gerson Mendez MD  General: Awake, alert and resting quietly. Appears in no acute distress  Eyes: PERTL,  HEENT: Thyroid not visibly enlarged. Oral  mucosa moist, no cyanosis  Respiratory: Respirations regular and unlabored at rest. BBS with good air entry in all fields. No crackles or wheezes auscultated  Cardiovascular: S1S2 Regular rate and rhythm. No murmur. No pretibial pitting edema  Gastrointestinal: Abdomen soft, flat, non tender. Bowel sounds present.   Musculoskeletal: QUINTEROS x4. No abnormal movements  Extremities: No digital cyanosis  Skin: Color pink. Skin warm and dry to touch.   Neuro: AAO x3 QUINTEROS x4              Cardiographics  Telemetry: SR  60's, no ectopy            Echocardiogram:  · SALINE TEST NEG FOR PFO  · Left Ventricle: Calculated EF = 66.7%.  · Trace-to-mild mitral valve regurgitation  · There is no evidence of pericardial effusion    Lab Review     Results from last 7 days  Lab Units 03/13/17  0516   SODIUM mmol/L 135*   POTASSIUM mmol/L 3.8   BUN mg/dL 16   CREATININE mg/dL 0.91   CALCIUM mg/dL 8.8       Assessment:  - Hypertensive urgency  - OD vision changes  - Renal insufficiency  - Rheumatoid arthritis  - Dyslipidemia      Plan:  - Hypertensive urgency - improved control on Norvasc and Lopressor. MI ruled out. Stress test was negative for ischemia    - Dyslipidemia - statin therapy started.  Patient states he is working with PCP on non-statin management strategies      Continue current Lopressor and Norvasc doses.  Start statin.  Repeat LFTs and lipid panel recommended in 8-12 weeks Review echo once completed and stress test      I reviewed the patient's new clinical results and treatment plan. I personally  viewed and interpreted the patient's EKG/Telemetry data    )3/13/2017  Gerson Mendez MD      EMR Dragon/Transcription disclaimer:   Much of this encounter note is an electronic transcription/translation of spoken language to printed text. The electronic translation of spoken language may permit erroneous, or at times, nonsensical words or phrases to be inadvertently transcribed; Although I have reviewed the note for such errors, some may still exist.

## 2017-03-13 NOTE — DISCHARGE SUMMARY
DATE OF ADMISSION:   03/09/2017  DATE OF DISCHARGE:  03/13/2017     PRIMARY CARE PHYSICIAN:  Az Tinoco MD    FINAL DIAGNOSES:     1.  Status post hypertensive urgency.   2.  Osteoarthritis.   3.  Rheumatoid arthritis.   4.  Hyperlipidemia.   5.  Gastroesophageal reflux disease.     DISCHARGE MEDICATIONS: Per discharge medication reconciliation sheet.    CONSULTANTS:      1.  Neurology.  2.  Cardiology.    PROCEDURE: Status post stress test which showed no ischemia.     HISTORY OF PRESENT ILLNESS: A 66-year-old white male with no significant past medical history except for rheumatoid arthritis and osteoarthritis admitted through the emergency room with very high blood pressure.     HOSPITAL COURSE: The patient was admitted.  His blood pressure stabilized. The patient continued to have headache and nausea. Neurology was on the case. They recommended MRI which was essentially negative. Once his blood pressure controlled, his symptoms completely resolved. He was also found to have increased cholesterol.  Lipitor was started. His stress test was negative. Echocardiogram looked fine with an ejection fraction of 66% and was discharged in stable condition at home on the above medication. He was advised to continue all the above medications and have a close followup with primary doctor. He needs to follow his eye doctor for his chronic eye issues and take medication as directed.        Newton Burger M.D.  AA:ute  D:   03/13/2017 13:44:59  T:   03/13/2017 14:08:35  Job ID:   [id number]  Document ID:   [id number]  cc:   Az Tinoco M.D.

## 2017-05-04 ENCOUNTER — OFFICE VISIT (OUTPATIENT)
Dept: CARDIOLOGY | Facility: CLINIC | Age: 67
End: 2017-05-04

## 2017-05-04 VITALS
HEART RATE: 58 BPM | BODY MASS INDEX: 30.16 KG/M2 | SYSTOLIC BLOOD PRESSURE: 147 MMHG | WEIGHT: 199 LBS | DIASTOLIC BLOOD PRESSURE: 78 MMHG | HEIGHT: 68 IN

## 2017-05-04 DIAGNOSIS — I10 ESSENTIAL HYPERTENSION: Primary | ICD-10-CM

## 2017-05-04 DIAGNOSIS — E78.5 HYPERLIPIDEMIA, UNSPECIFIED HYPERLIPIDEMIA TYPE: ICD-10-CM

## 2017-05-04 PROCEDURE — 99213 OFFICE O/P EST LOW 20 MIN: CPT | Performed by: INTERNAL MEDICINE

## 2017-05-04 RX ORDER — HYDRALAZINE HYDROCHLORIDE 25 MG/1
25 TABLET, FILM COATED ORAL 3 TIMES DAILY
COMMUNITY
End: 2018-07-04

## 2017-05-04 RX ORDER — AMLODIPINE BESYLATE 10 MG/1
10 TABLET ORAL DAILY
COMMUNITY

## 2017-05-04 RX ORDER — PANTOPRAZOLE SODIUM 40 MG/1
40 TABLET, DELAYED RELEASE ORAL DAILY
COMMUNITY

## 2017-05-04 RX ORDER — BISOPROLOL FUMARATE AND HYDROCHLOROTHIAZIDE 5; 6.25 MG/1; MG/1
1 TABLET ORAL DAILY
Qty: 30 TABLET | Refills: 6 | Status: SHIPPED | OUTPATIENT
Start: 2017-05-04 | End: 2017-11-21 | Stop reason: SDUPTHER

## 2017-05-04 RX ORDER — ATORVASTATIN CALCIUM 10 MG/1
10 TABLET, FILM COATED ORAL DAILY
COMMUNITY

## 2017-05-05 PROBLEM — E78.5 HYPERLIPIDEMIA: Status: ACTIVE | Noted: 2017-05-05

## 2017-05-05 PROBLEM — I10 ESSENTIAL HYPERTENSION: Status: ACTIVE | Noted: 2017-05-05

## 2017-06-01 ENCOUNTER — OFFICE VISIT (OUTPATIENT)
Dept: CARDIOLOGY | Facility: CLINIC | Age: 67
End: 2017-06-01

## 2017-06-01 VITALS
HEIGHT: 68 IN | WEIGHT: 204 LBS | DIASTOLIC BLOOD PRESSURE: 74 MMHG | BODY MASS INDEX: 30.92 KG/M2 | HEART RATE: 48 BPM | SYSTOLIC BLOOD PRESSURE: 129 MMHG

## 2017-06-01 DIAGNOSIS — I10 ESSENTIAL HYPERTENSION: Primary | ICD-10-CM

## 2017-06-01 DIAGNOSIS — E78.5 HYPERLIPIDEMIA, UNSPECIFIED HYPERLIPIDEMIA TYPE: ICD-10-CM

## 2017-06-01 PROCEDURE — 99213 OFFICE O/P EST LOW 20 MIN: CPT | Performed by: INTERNAL MEDICINE

## 2017-06-01 NOTE — PROGRESS NOTES
" Subjective:       Richar Brito is a 67 y.o. male who here for follow up    CC  Follow-up after the change in medications  HPI  A 67-year-old white female with known history of hypertension benign essential, along with a history of the hyperlipidemia, had blood pressure medication changed approximately one month ago because of the high suspicion is here for the follow-up with no side effects and the blood pressures are much better     Problem List Items Addressed This Visit        Cardiovascular and Mediastinum    Essential hypertension - Primary    Hyperlipidemia        .    The following portions of the patient's history were reviewed and updated as appropriate: allergies, current medications, past family history, past medical history, past social history, past surgical history and problem list.    Past Medical History:   Diagnosis Date   • Arthritis    • GERD (gastroesophageal reflux disease)    • Hyperlipidemia    • Hypertension     reports that he has never smoked. He does not have any smokeless tobacco history on file. He reports that he does not drink alcohol or use illicit drugs.  Family History   Problem Relation Age of Onset   • No Known Problems Mother    • No Known Problems Father        Review of Systems  Constitutional: No wt loss, fever, fatigue  Gastrointestinal: No nausea, abdominal pain  Behavioral/Psych: No insomnia or anxiety   Cardiovascular no chest pains or tightness in chest  Objective:       Physical Exam           Physical Exam  /74  Pulse (!) 48  Ht 68\" (172.7 cm)  Wt 204 lb (92.5 kg)  BMI 31.02 kg/m2    General appearance: NAD, conversant   Eyes: anicteric sclerae, moist conjunctivae; no lid-lag; PERRLA   HENT: Atraumatic; oropharynx clear with moist mucous membranes and no mucosal ulcerations;  normal hard and soft palate   Neck: Trachea midline; FROM, supple, no thyromegaly or lymphadenopathy   Lungs: CTA, with normal respiratory effort and no intercostal retractions   CV: " S1-S2 regular, no murmurs, no rub, no gallop   Abdomen: Soft, non-tender; no masses or HSM   Extremities: No peripheral edema or extremity lymphadenopathy  Skin: Normal temperature, turgor and texture; no rash, ulcers or subcutaneous nodules   Psych: Appropriate affect, alert and oriented to person, place and time           Cardiographics  @Procedures    Echocardiogram:        Current Outpatient Prescriptions:   •  amLODIPine (NORVASC) 10 MG tablet, Take 10 mg by mouth Daily., Disp: , Rfl:   •  aspirin 81 MG chewable tablet, Chew 81 mg Daily., Disp: , Rfl:   •  atorvastatin (LIPITOR) 10 MG tablet, Take 10 mg by mouth Daily., Disp: , Rfl:   •  bisoprolol-hydrochlorothiazide (ZIAC) 5-6.25 MG per tablet, Take 1 tablet by mouth Daily., Disp: 30 tablet, Rfl: 6  •  Etanercept (ENBREL SURECLICK) 50 MG/ML solution auto-injector, Inject  under the skin 1 (One) Time Per Week., Disp: , Rfl:   •  hydrALAZINE (APRESOLINE) 25 MG tablet, Take 25 mg by mouth 3 (Three) Times a Day., Disp: , Rfl:   •  leflunomide (ARAVA) 10 MG tablet, Take 10 mg by mouth Daily., Disp: , Rfl:   •  pantoprazole (PROTONIX) 40 MG EC tablet, Take 40 mg by mouth Daily., Disp: , Rfl:    Assessment:        Patient Active Problem List   Diagnosis   • Hypertensive urgency   • Essential hypertension   • Hyperlipidemia               Plan:            ICD-10-CM ICD-9-CM   1. Essential hypertension I10 401.9   2. Hyperlipidemia, unspecified hyperlipidemia type E78.5 272.4     1. Essential hypertension  Markedly improved with current medications    2. Hyperlipidemia, unspecified hyperlipidemia type  Well-controlled with current statin    bp med change follow up     No side effects    Interpretation Summary   · Findings consistent with a normal ECG stress test.  · Left ventricular ejection fraction is normal (Calculated EF = 60%).  · Myocardial perfusion imaging indicates a normal myocardial perfusion study with no evidence of ischemia.  · Impressions are consistent  with a low risk study.     See 1 yr  COUNSELING:    Richar Randall was given to patient for the following topics: diagnostic results, risk factor reductions, impressions, risks and benefits of treatment options and importance of treatment compliance .       SMOKING COUNSELING:    Counseling given: Not Answered      EMR Dragon/Transcription disclaimer:   Much of this encounter note is an electronic transcription/translation of spoken language to printed text. The electronic translation of spoken language may permit erroneous, or at times, nonsensical words or phrases to be inadvertently transcribed; Although I have reviewed the note for such errors, some may still exist.

## 2017-11-21 RX ORDER — BISOPROLOL FUMARATE AND HYDROCHLOROTHIAZIDE 5; 6.25 MG/1; MG/1
TABLET ORAL
Qty: 30 TABLET | Refills: 6 | Status: SHIPPED | OUTPATIENT
Start: 2017-11-21

## 2018-06-20 RX ORDER — BISOPROLOL FUMARATE AND HYDROCHLOROTHIAZIDE 5; 6.25 MG/1; MG/1
TABLET ORAL
Qty: 30 TABLET | Refills: 6 | OUTPATIENT
Start: 2018-06-20

## 2018-06-28 RX ORDER — BISOPROLOL FUMARATE AND HYDROCHLOROTHIAZIDE 5; 6.25 MG/1; MG/1
TABLET ORAL
Qty: 30 TABLET | Refills: 6 | OUTPATIENT
Start: 2018-06-28

## 2018-07-04 ENCOUNTER — APPOINTMENT (OUTPATIENT)
Dept: CT IMAGING | Facility: HOSPITAL | Age: 68
End: 2018-07-04

## 2018-07-04 ENCOUNTER — HOSPITAL ENCOUNTER (EMERGENCY)
Facility: HOSPITAL | Age: 68
Discharge: HOME | End: 2018-07-04
Attending: EMERGENCY MEDICINE | Admitting: EMERGENCY MEDICINE

## 2018-07-04 VITALS
HEART RATE: 56 BPM | TEMPERATURE: 97.4 F | BODY MASS INDEX: 29.86 KG/M2 | SYSTOLIC BLOOD PRESSURE: 152 MMHG | RESPIRATION RATE: 14 BRPM | OXYGEN SATURATION: 97 % | HEIGHT: 68 IN | DIASTOLIC BLOOD PRESSURE: 90 MMHG | WEIGHT: 197 LBS

## 2018-07-04 DIAGNOSIS — N20.1 URETEROLITHIASIS: Primary | ICD-10-CM

## 2018-07-04 DIAGNOSIS — K76.9 LESION OF LIVER: ICD-10-CM

## 2018-07-04 LAB
ALBUMIN SERPL-MCNC: 4.5 G/DL (ref 3.5–5.2)
ALBUMIN/GLOB SERPL: 1.6 G/DL
ALP SERPL-CCNC: 61 U/L (ref 39–117)
ALT SERPL W P-5'-P-CCNC: 18 U/L (ref 1–41)
ANION GAP SERPL CALCULATED.3IONS-SCNC: 15.7 MMOL/L
AST SERPL-CCNC: 27 U/L (ref 1–40)
BACTERIA UR QL AUTO: ABNORMAL /HPF
BASOPHILS # BLD AUTO: 0.05 10*3/MM3 (ref 0–0.2)
BASOPHILS NFR BLD AUTO: 0.5 % (ref 0–1.5)
BILIRUB SERPL-MCNC: 0.9 MG/DL (ref 0.1–1.2)
BILIRUB UR QL STRIP: NEGATIVE
BUN BLD-MCNC: 12 MG/DL (ref 8–23)
BUN/CREAT SERPL: 8.5 (ref 7–25)
CALCIUM SPEC-SCNC: 8.9 MG/DL (ref 8.6–10.5)
CHLORIDE SERPL-SCNC: 98 MMOL/L (ref 98–107)
CLARITY UR: CLEAR
CO2 SERPL-SCNC: 22.3 MMOL/L (ref 22–29)
COLOR UR: YELLOW
CREAT BLD-MCNC: 1.42 MG/DL (ref 0.76–1.27)
DEPRECATED RDW RBC AUTO: 44.3 FL (ref 37–54)
EOSINOPHIL # BLD AUTO: 0.12 10*3/MM3 (ref 0–0.7)
EOSINOPHIL NFR BLD AUTO: 1.2 % (ref 0.3–6.2)
ERYTHROCYTE [DISTWIDTH] IN BLOOD BY AUTOMATED COUNT: 13.4 % (ref 11.5–14.5)
GFR SERPL CREATININE-BSD FRML MDRD: 50 ML/MIN/1.73
GLOBULIN UR ELPH-MCNC: 2.8 GM/DL
GLUCOSE BLD-MCNC: 112 MG/DL (ref 65–99)
GLUCOSE UR STRIP-MCNC: NEGATIVE MG/DL
HCT VFR BLD AUTO: 46.2 % (ref 40.4–52.2)
HGB BLD-MCNC: 15.4 G/DL (ref 13.7–17.6)
HGB UR QL STRIP.AUTO: ABNORMAL
HOLD SPECIMEN: NORMAL
HOLD SPECIMEN: NORMAL
HYALINE CASTS UR QL AUTO: ABNORMAL /LPF
IMM GRANULOCYTES # BLD: 0.02 10*3/MM3 (ref 0–0.03)
IMM GRANULOCYTES NFR BLD: 0.2 % (ref 0–0.5)
KETONES UR QL STRIP: NEGATIVE
LEUKOCYTE ESTERASE UR QL STRIP.AUTO: ABNORMAL
LYMPHOCYTES # BLD AUTO: 1.85 10*3/MM3 (ref 0.9–4.8)
LYMPHOCYTES NFR BLD AUTO: 19.1 % (ref 19.6–45.3)
MCH RBC QN AUTO: 30.1 PG (ref 27–32.7)
MCHC RBC AUTO-ENTMCNC: 33.3 G/DL (ref 32.6–36.4)
MCV RBC AUTO: 90.2 FL (ref 79.8–96.2)
MONOCYTES # BLD AUTO: 0.79 10*3/MM3 (ref 0.2–1.2)
MONOCYTES NFR BLD AUTO: 8.2 % (ref 5–12)
NEUTROPHILS # BLD AUTO: 6.86 10*3/MM3 (ref 1.9–8.1)
NEUTROPHILS NFR BLD AUTO: 71 % (ref 42.7–76)
NITRITE UR QL STRIP: NEGATIVE
NRBC BLD MANUAL-RTO: 0 /100 WBC (ref 0–0)
PH UR STRIP.AUTO: 5.5 [PH] (ref 5–8)
PLATELET # BLD AUTO: 207 10*3/MM3 (ref 140–500)
PMV BLD AUTO: 9.9 FL (ref 6–12)
POTASSIUM BLD-SCNC: 5 MMOL/L (ref 3.5–5.2)
PROT SERPL-MCNC: 7.3 G/DL (ref 6–8.5)
PROT UR QL STRIP: NEGATIVE
RBC # BLD AUTO: 5.12 10*6/MM3 (ref 4.6–6)
RBC # UR: ABNORMAL /HPF
REF LAB TEST METHOD: ABNORMAL
SODIUM BLD-SCNC: 136 MMOL/L (ref 136–145)
SP GR UR STRIP: 1.01 (ref 1–1.03)
SQUAMOUS #/AREA URNS HPF: ABNORMAL /HPF
UROBILINOGEN UR QL STRIP: ABNORMAL
WBC NRBC COR # BLD: 9.67 10*3/MM3 (ref 4.5–10.7)
WBC UR QL AUTO: ABNORMAL /HPF
WHOLE BLOOD HOLD SPECIMEN: NORMAL
WHOLE BLOOD HOLD SPECIMEN: NORMAL

## 2018-07-04 PROCEDURE — 85025 COMPLETE CBC W/AUTO DIFF WBC: CPT | Performed by: EMERGENCY MEDICINE

## 2018-07-04 PROCEDURE — 80053 COMPREHEN METABOLIC PANEL: CPT | Performed by: EMERGENCY MEDICINE

## 2018-07-04 PROCEDURE — 81001 URINALYSIS AUTO W/SCOPE: CPT | Performed by: EMERGENCY MEDICINE

## 2018-07-04 PROCEDURE — 99283 EMERGENCY DEPT VISIT LOW MDM: CPT

## 2018-07-04 PROCEDURE — 74176 CT ABD & PELVIS W/O CONTRAST: CPT

## 2018-07-04 RX ORDER — SODIUM CHLORIDE 0.9 % (FLUSH) 0.9 %
10 SYRINGE (ML) INJECTION AS NEEDED
Status: DISCONTINUED | OUTPATIENT
Start: 2018-07-04 | End: 2018-07-04 | Stop reason: HOSPADM

## 2018-07-04 RX ORDER — TAMSULOSIN HYDROCHLORIDE 0.4 MG/1
1 CAPSULE ORAL NIGHTLY
Qty: 14 CAPSULE | Refills: 0 | Status: SHIPPED | OUTPATIENT
Start: 2018-07-04

## 2018-07-04 RX ORDER — HYDROCODONE BITARTRATE AND ACETAMINOPHEN 7.5; 325 MG/1; MG/1
1 TABLET ORAL EVERY 4 HOURS PRN
Qty: 15 TABLET | Refills: 0 | Status: SHIPPED | OUTPATIENT
Start: 2018-07-04

## 2018-07-04 NOTE — ED NOTES
"Pt walks into  and states, \"I think I have kidney stone.\" pt reports it started last night and thought it went away and then started up again today. Pt reports having hx of kidney stones. Pt reports pain is on the right side.      Josefina Khan RN  07/04/18 1952    "

## 2018-07-05 NOTE — DISCHARGE INSTRUCTIONS
Return to the ER if you develop fever, uncontrolled pain, inability to keep down fluids and meds or other concerns.

## 2018-07-05 NOTE — ED PROVIDER NOTES
EMERGENCY DEPARTMENT ENCOUNTER    CHIEF COMPLAINT  Chief Complaint: flank pain  History given by: patient  History limited by: none  Room Number: 20/20  PMD: Az Tinoco MD      HPI:  Pt is a 68 y.o. male who presents complaining of right sided flank pain that radiates down to the right groin for two days. He has associated intermittent hematuria with some difficulty urinating. He denies fever, nausea, and vomiting. He has had previous kidney stones, and the pain feels similar. He took a hydrocodone approximately an hour ago and it  has improved the pain since being in the ED.    Duration: 2 days  Onset: sudden  Timing: constant  Location: right flank  Radiation: right groin  Quality: 'pain'  Intensity/Severity: moderate  Progression: improved after home medication  Associated Symptoms: hematuria and difficulty urinating  Aggravating Factors: none  Alleviating Factors: none  Previous Episodes: pt had similar presentation w/ prior kidney stones  Treatment before arrival: pt took a hydrocodone at home    PAST MEDICAL HISTORY  Active Ambulatory Problems     Diagnosis Date Noted   • Hypertensive urgency 03/09/2017   • Essential hypertension 05/05/2017   • Hyperlipidemia 05/05/2017     Resolved Ambulatory Problems     Diagnosis Date Noted   • No Resolved Ambulatory Problems     Past Medical History:   Diagnosis Date   • Arthritis    • GERD (gastroesophageal reflux disease)    • Hyperlipidemia    • Hypertension    • Kidney stone    • Rheumatoid arthritis (CMS/HCC)        PAST SURGICAL HISTORY  Past Surgical History:   Procedure Laterality Date   • HERNIA REPAIR         FAMILY HISTORY  Family History   Problem Relation Age of Onset   • No Known Problems Mother    • No Known Problems Father        SOCIAL HISTORY  Social History     Social History   • Marital status:      Spouse name: N/A   • Number of children: N/A   • Years of education: N/A     Occupational History   • Not on file.     Social History Main  Topics   • Smoking status: Never Smoker   • Smokeless tobacco: Not on file   • Alcohol use No   • Drug use: No   • Sexual activity: Defer     Other Topics Concern   • Not on file     Social History Narrative   • No narrative on file       ALLERGIES  Patient has no known allergies.    REVIEW OF SYSTEMS  Review of Systems   Constitutional: Negative for activity change, appetite change and fever.   HENT: Negative for congestion and sore throat.    Eyes: Negative.    Respiratory: Negative for cough and shortness of breath.    Cardiovascular: Negative for chest pain and leg swelling.   Gastrointestinal: Negative for abdominal pain, diarrhea and vomiting.   Endocrine: Negative.    Genitourinary: Positive for difficulty urinating, flank pain and hematuria. Negative for decreased urine volume and dysuria.   Musculoskeletal: Negative for neck pain.   Skin: Negative for rash and wound.   Allergic/Immunologic: Negative.    Neurological: Negative for weakness, numbness and headaches.   Hematological: Negative.    Psychiatric/Behavioral: Negative.    All other systems reviewed and are negative.      PHYSICAL EXAM  ED Triage Vitals   Temp Heart Rate Resp BP SpO2   07/04/18 1952 07/04/18 1952 07/04/18 1952 07/04/18 2000 07/04/18 1952   97.4 °F (36.3 °C) 67 18 168/88 98 %      Temp src Heart Rate Source Patient Position BP Location FiO2 (%)   07/04/18 1952 -- 07/04/18 2000 07/04/18 2000 --   Tympanic  Sitting Right arm        Physical Exam   Constitutional: He is oriented to person, place, and time. No distress.   HENT:   Head: Normocephalic and atraumatic.   Eyes: EOM are normal. Pupils are equal, round, and reactive to light.   Neck: Normal range of motion. Neck supple.   Cardiovascular: Normal rate, regular rhythm and normal heart sounds.    Pulmonary/Chest: Effort normal and breath sounds normal. No respiratory distress.   Abdominal: Soft. There is no tenderness. There is no rebound, no guarding and no CVA tenderness.    Musculoskeletal: Normal range of motion. He exhibits no edema.   Neurological: He is alert and oriented to person, place, and time. He has normal sensation and normal strength.   Skin: Skin is warm and dry.   Psychiatric: Mood and affect normal.   Nursing note and vitals reviewed.      LAB RESULTS  Lab Results (last 24 hours)     Procedure Component Value Units Date/Time    CBC & Differential [[id number]] Collected:  07/04/18 2012    Specimen:  Blood Updated:  07/04/18 2039    Narrative:       The following orders were created for panel order CBC & Differential.  Procedure                               Abnormality         Status                     ---------                               -----------         ------                     Scan Slide[[id number]]                                                                   CBC Auto Differential[[id number]]         Abnormal            Final result                 Please view results for these tests on the individual orders.    Comprehensive Metabolic Panel [[id number]]  (Abnormal) Collected:  07/04/18 2012    Specimen:  Blood Updated:  07/04/18 2050     Glucose 112 (H) mg/dL      BUN 12 mg/dL      Creatinine 1.42 (H) mg/dL      Sodium 136 mmol/L      Potassium 5.0 mmol/L      Comment: Specimen hemolyzed.  Results may be affected. Ok to release per Dr Macdonald         Chloride 98 mmol/L      CO2 22.3 mmol/L      Calcium 8.9 mg/dL      Total Protein 7.3 g/dL      Albumin 4.50 g/dL      ALT (SGPT) 18 U/L      Comment: Specimen hemolyzed.  Results may be affected.        AST (SGOT) 27 U/L      Comment: Specimen hemolyzed.  Results may be affected.        Alkaline Phosphatase 61 U/L      Total Bilirubin 0.9 mg/dL      eGFR Non African Amer 50 (L) mL/min/1.73      Globulin 2.8 gm/dL      A/G Ratio 1.6 g/dL      BUN/Creatinine Ratio 8.5     Anion Gap 15.7 mmol/L     Urinalysis With Culture If Indicated - Urine, Clean Catch [[id number]]  (Abnormal) Collected:  07/04/18 2012    Specimen:   Urine from Urine, Clean Catch Updated:  07/04/18 2027     Color, UA Yellow     Appearance, UA Clear     pH, UA 5.5     Specific Gravity, UA 1.013     Glucose, UA Negative     Ketones, UA Negative     Bilirubin, UA Negative     Blood, UA Large (3+) (A)     Protein, UA Negative     Leuk Esterase, UA Trace (A)     Nitrite, UA Negative     Urobilinogen, UA 1.0 E.U./dL    CBC Auto Differential [[id number]]  (Abnormal) Collected:  07/04/18 2012    Specimen:  Blood Updated:  07/04/18 2039     WBC 9.67 10*3/mm3      RBC 5.12 10*6/mm3      Hemoglobin 15.4 g/dL      Hematocrit 46.2 %      MCV 90.2 fL      MCH 30.1 pg      MCHC 33.3 g/dL      RDW 13.4 %      RDW-SD 44.3 fl      MPV 9.9 fL      Platelets 207 10*3/mm3      Neutrophil % 71.0 %      Lymphocyte % 19.1 (L) %      Monocyte % 8.2 %      Eosinophil % 1.2 %      Basophil % 0.5 %      Immature Grans % 0.2 %      Neutrophils, Absolute 6.86 10*3/mm3      Lymphocytes, Absolute 1.85 10*3/mm3      Monocytes, Absolute 0.79 10*3/mm3      Eosinophils, Absolute 0.12 10*3/mm3      Basophils, Absolute 0.05 10*3/mm3      Immature Grans, Absolute 0.02 10*3/mm3      nRBC 0.0 /100 WBC     Urinalysis, Microscopic Only - Urine, Clean Catch [[id number]]  (Abnormal) Collected:  07/04/18 2012    Specimen:  Urine from Urine, Clean Catch Updated:  07/04/18 2027     RBC, UA Too Numerous to Count (A) /HPF      WBC, UA 3-5 (A) /HPF      Bacteria, UA None Seen /HPF      Squamous Epithelial Cells, UA 0-2 /HPF      Hyaline Casts, UA 0-2 /LPF      Methodology Automated Microscopy          I ordered the above labs and reviewed the results    RADIOLOGY  CT Abdomen Pelvis Without Contrast   Preliminary Result   1.  Mild right hydronephrosis and hydroureter, a 0.4 cm stone obstructs   the right UVJ.    2.  Nonobstructing renal calculi bilaterally measuring up to 0.4 cm.   3.  Indeterminate 1.5 cm lesion of the right hepatic lobe may be further   evaluated by CT scan or MRI performed as liver mass protocol.    4.  0.5 cm right hepatic cyst.   5.  2.6 cm fatty umbilical hernia.                   I ordered the above noted radiological studies. Interpreted by radiologist. Reviewed by me in PACS.       PROCEDURES  Procedures      PROGRESS AND CONSULTS     2051  BP- 168/88 HR- 52 Temp- 97.4 °F (36.3 °C) (Tympanic) O2 sat- 98%  Rechecked the patient who is in NAD and is resting comfortably. Discussed imaging w/ the pt showing a 4mm stone close to passing into the bladder. Pt told the plan to d/c w/ Flomax and rx for support of sx as well as follow up w/ urology. Patient also informed of liver lesion on CT scan that needs follow up with PCP.  Pt given return to ED instructions. Pt understands and agrees with the plan, all questions answered.    MEDICAL DECISION MAKING  Results were reviewed/discussed with the patient and they were also made aware of online access. Pt also made aware that some labs, such as cultures, will not be resulted during ER visit and follow up with PMD is necessary.     MDM  Number of Diagnoses or Management Options     Amount and/or Complexity of Data Reviewed  Clinical lab tests: reviewed (UA too numerous to count RBC)  Tests in the radiology section of CPT®: reviewed (CT abd-4 mm stone right UVJ)  Decide to obtain previous medical records or to obtain history from someone other than the patient: yes (Last admission 03/17 for uncontrolled HTN)  Independent visualization of images, tracings, or specimens: yes    Patient Progress  Patient progress: stable         DIAGNOSIS  Final diagnoses:   Ureterolithiasis   Lesion of liver       DISPOSITION  DISCHARGE    Patient discharged in stable condition.    Reviewed implications of results, diagnosis, meds, responsibility to follow up, warning signs and symptoms of possible worsening, potential complications and reasons to return to ER.    Patient/Family voiced understanding of above instructions.    Discussed plan for discharge, as there is no emergent  indication for admission. Patient referred to primary care provider for BP management due to today's BP. Pt/family is agreeable and understands need for follow up and repeat testing.  Pt is aware that discharge does not mean that nothing is wrong but it indicates no emergency is present that requires admission and they must continue care with follow-up as given below or physician of their choice.     FOLLOW-UP  Bro Chopra MD  [address] [place], [place] [id number] [phone]    Schedule an appointment as soon as possible for a visit   Follow up [facility] douglas Tinoco MD  [address] [facility] [address]  [phone]      Follow up liver lesion seen on CT scan         Medication List      No changes were made to your prescriptions during this visit.       Latest Documented Vital Signs:  As of 8:59 PM  BP- 168/88 HR- 52 Temp- 97.4 °F (36.3 °C) (Tympanic) O2 sat- 98%    --  Documentation assistance provided by america Juarez for Dr. Macdonald.  Information recorded by the scribe was done at my direction and has been verified and validated by me.     Shari Juarez  07/04/18 8097       Juve Macdonald MD  07/04/18 7726

## 2018-07-10 RX ORDER — BISOPROLOL FUMARATE AND HYDROCHLOROTHIAZIDE 5; 6.25 MG/1; MG/1
TABLET ORAL
Qty: 30 TABLET | Refills: 6 | OUTPATIENT
Start: 2018-07-10

## 2020-01-13 ENCOUNTER — APPOINTMENT (OUTPATIENT)
Dept: CT IMAGING | Facility: HOSPITAL | Age: 70
End: 2020-01-13

## 2020-01-13 ENCOUNTER — HOSPITAL ENCOUNTER (EMERGENCY)
Facility: HOSPITAL | Age: 70
Discharge: HOME | End: 2020-01-13
Attending: EMERGENCY MEDICINE | Admitting: EMERGENCY MEDICINE

## 2020-01-13 VITALS
SYSTOLIC BLOOD PRESSURE: 126 MMHG | DIASTOLIC BLOOD PRESSURE: 85 MMHG | RESPIRATION RATE: 16 BRPM | BODY MASS INDEX: 30.31 KG/M2 | HEIGHT: 68 IN | TEMPERATURE: 97.8 F | OXYGEN SATURATION: 96 % | HEART RATE: 56 BPM | WEIGHT: 200 LBS

## 2020-01-13 DIAGNOSIS — R22.0 SCALP MASS: Primary | ICD-10-CM

## 2020-01-13 LAB
ALBUMIN SERPL-MCNC: 4.4 G/DL (ref 3.5–5.2)
ALBUMIN/GLOB SERPL: 1.3 G/DL
ALP SERPL-CCNC: 84 U/L (ref 39–117)
ALT SERPL W P-5'-P-CCNC: 20 U/L (ref 1–41)
ANION GAP SERPL CALCULATED.3IONS-SCNC: 11.7 MMOL/L (ref 5–15)
AST SERPL-CCNC: 21 U/L (ref 1–40)
BASOPHILS # BLD AUTO: 0.08 10*3/MM3 (ref 0–0.2)
BASOPHILS NFR BLD AUTO: 0.7 % (ref 0–1.5)
BILIRUB SERPL-MCNC: 0.5 MG/DL (ref 0.2–1.2)
BILIRUB UR QL STRIP: NEGATIVE
BUN BLD-MCNC: 17 MG/DL (ref 8–23)
BUN/CREAT SERPL: 11 (ref 7–25)
CALCIUM SPEC-SCNC: 9.2 MG/DL (ref 8.6–10.5)
CHLORIDE SERPL-SCNC: 99 MMOL/L (ref 98–107)
CLARITY UR: CLEAR
CO2 SERPL-SCNC: 27.3 MMOL/L (ref 22–29)
COLOR UR: YELLOW
CREAT BLD-MCNC: 1.54 MG/DL (ref 0.76–1.27)
DEPRECATED RDW RBC AUTO: 42.8 FL (ref 37–54)
EOSINOPHIL # BLD AUTO: 0.28 10*3/MM3 (ref 0–0.4)
EOSINOPHIL NFR BLD AUTO: 2.3 % (ref 0.3–6.2)
ERYTHROCYTE [DISTWIDTH] IN BLOOD BY AUTOMATED COUNT: 13 % (ref 12.3–15.4)
GFR SERPL CREATININE-BSD FRML MDRD: 45 ML/MIN/1.73
GLOBULIN UR ELPH-MCNC: 3.3 GM/DL
GLUCOSE BLD-MCNC: 82 MG/DL (ref 65–99)
GLUCOSE UR STRIP-MCNC: NEGATIVE MG/DL
HCT VFR BLD AUTO: 45.5 % (ref 37.5–51)
HGB BLD-MCNC: 15.3 G/DL (ref 13–17.7)
HGB UR QL STRIP.AUTO: NEGATIVE
IMM GRANULOCYTES # BLD AUTO: 0.07 10*3/MM3 (ref 0–0.05)
IMM GRANULOCYTES NFR BLD AUTO: 0.6 % (ref 0–0.5)
KETONES UR QL STRIP: NEGATIVE
LEUKOCYTE ESTERASE UR QL STRIP.AUTO: NEGATIVE
LYMPHOCYTES # BLD AUTO: 1.26 10*3/MM3 (ref 0.7–3.1)
LYMPHOCYTES NFR BLD AUTO: 10.4 % (ref 19.6–45.3)
MCH RBC QN AUTO: 30.4 PG (ref 26.6–33)
MCHC RBC AUTO-ENTMCNC: 33.6 G/DL (ref 31.5–35.7)
MCV RBC AUTO: 90.3 FL (ref 79–97)
MONOCYTES # BLD AUTO: 0.88 10*3/MM3 (ref 0.1–0.9)
MONOCYTES NFR BLD AUTO: 7.2 % (ref 5–12)
NEUTROPHILS # BLD AUTO: 9.6 10*3/MM3 (ref 1.7–7)
NEUTROPHILS NFR BLD AUTO: 78.8 % (ref 42.7–76)
NITRITE UR QL STRIP: NEGATIVE
NRBC BLD AUTO-RTO: 0 /100 WBC (ref 0–0.2)
PH UR STRIP.AUTO: <=5 [PH] (ref 5–8)
PLATELET # BLD AUTO: 244 10*3/MM3 (ref 140–450)
PMV BLD AUTO: 10.2 FL (ref 6–12)
POTASSIUM BLD-SCNC: 3.8 MMOL/L (ref 3.5–5.2)
PROT SERPL-MCNC: 7.7 G/DL (ref 6–8.5)
PROT UR QL STRIP: NEGATIVE
RBC # BLD AUTO: 5.04 10*6/MM3 (ref 4.14–5.8)
SODIUM BLD-SCNC: 138 MMOL/L (ref 136–145)
SP GR UR STRIP: 1.01 (ref 1–1.03)
UROBILINOGEN UR QL STRIP: NORMAL
WBC NRBC COR # BLD: 12.17 10*3/MM3 (ref 3.4–10.8)

## 2020-01-13 PROCEDURE — 85025 COMPLETE CBC W/AUTO DIFF WBC: CPT | Performed by: PHYSICIAN ASSISTANT

## 2020-01-13 PROCEDURE — 96374 THER/PROPH/DIAG INJ IV PUSH: CPT

## 2020-01-13 PROCEDURE — 80053 COMPREHEN METABOLIC PANEL: CPT | Performed by: PHYSICIAN ASSISTANT

## 2020-01-13 PROCEDURE — 25010000002 ONDANSETRON PER 1 MG: Performed by: EMERGENCY MEDICINE

## 2020-01-13 PROCEDURE — 81003 URINALYSIS AUTO W/O SCOPE: CPT | Performed by: PHYSICIAN ASSISTANT

## 2020-01-13 PROCEDURE — 96375 TX/PRO/DX INJ NEW DRUG ADDON: CPT

## 2020-01-13 PROCEDURE — 99283 EMERGENCY DEPT VISIT LOW MDM: CPT

## 2020-01-13 PROCEDURE — 25010000002 MORPHINE PER 10 MG: Performed by: EMERGENCY MEDICINE

## 2020-01-13 PROCEDURE — 70450 CT HEAD/BRAIN W/O DYE: CPT

## 2020-01-13 RX ORDER — ONDANSETRON 2 MG/ML
4 INJECTION INTRAMUSCULAR; INTRAVENOUS ONCE
Status: COMPLETED | OUTPATIENT
Start: 2020-01-13 | End: 2020-01-13

## 2020-01-13 RX ORDER — MORPHINE SULFATE 2 MG/ML
4 INJECTION, SOLUTION INTRAMUSCULAR; INTRAVENOUS ONCE
Status: COMPLETED | OUTPATIENT
Start: 2020-01-13 | End: 2020-01-13

## 2020-01-13 RX ADMIN — ONDANSETRON 4 MG: 2 INJECTION INTRAMUSCULAR; INTRAVENOUS at 17:37

## 2020-01-13 RX ADMIN — MORPHINE SULFATE 4 MG: 2 INJECTION, SOLUTION INTRAMUSCULAR; INTRAVENOUS at 17:37

## 2020-01-13 NOTE — ED TRIAGE NOTES
Pt reports he woke up Friday with a stiff neck. Pt reports pain is on right side of head and radiates down neck. Pt also has a swollen lump on right back of neck. Pt denies injury. Pain worse with movement.

## 2020-01-13 NOTE — ED NOTES
"Pt has a small bump on back of head/neck for a year. Head has been hurting for a \"day or so\". Pt states he took Excedrin and the headache got better but after it wore off it came right back.     Keren Aleman, RN  Resident  01/13/20 7043    "

## 2020-01-13 NOTE — ED PROVIDER NOTES
"MD ATTESTATION NOTE    Patient is a 69 y.o. male who presents to the ED with complaint of posterior neck pain with radiation to his posterior head that started two days ago. The patient denies known injury or recent trauma to the neck or head. The patient describes the neck pain as a \"stiffness\" sensation. The patient also complains of an associated \"knot\" to his his occipital scalp which he noticed approximately 6-8 months. The patient reports the mass varies in size. He reports the mass increases and decreases in size. The patient denies frontal headache or photophobia.     Physical Exam:  Constitutional: awake, alert, no acute distress, non-toxic appearing  HENT: mass to the occipital scalp  Neck: there are no meningeal signs   Cardiovascular: regular rhythm, regular rate  Pulmonary/Chest: normal effort, lung are CTAB  Neurological: alert, moves all extremities, follows commands  Skin: warm, dry  Vital signs and nursing notes reviewed.    Workup reviewed. WBC is 12.17. UA is negative. CT Head is negative acute. Plan is to discharge the patient home with instructions to f/u with ENT for further evaluation and management.    The STEFANIA and I have discussed this patient's history, physical exam, and treatment plan.  I have reviewed the documentation and personally had a face to face interaction with the patient. I affirm the documentation and agree with the treatment and plan.  The attached note describes my personal findings.    Documentation assistance provided by america Morfin for Dr. Toma MD. Information recorded by the scribe was done at my direction and has been verified and validated by me.     Emmanuelle Morfin  01/13/20 1724       Jenaro Chua MD  01/13/20 1921    "

## 2020-01-13 NOTE — ED TRIAGE NOTES
Daughter- in-law to desk and reports that wife told her that patient has been more confused since neck pain started.

## 2020-01-13 NOTE — ED PROVIDER NOTES
"EMERGENCY DEPARTMENT ENCOUNTER    Room Number:  34/34  Date seen:  1/13/2020  Time seen: 4:09 PM  PCP: Az Tinoco MD  Historian: Abraham      HPI:  Chief complaint: Neck pain  Context: Richar Brito is a 69 y.o. male with hx of rheumatoid arthritis who presents to the ED c/o gradual onset, constant \"stiff\", sharp neck pain beginning 2 days ago. The pain initially began in his neck and began radiating to the back of his head a day ago. The pain is exacerbated with head movements. Pt also has a knot on the occipital portion of his head that has been present for about 6-8 months. Pt reports the knot has remained consistent in size, however, family at bedside believe the knot has enlarged. Family also noticed possible increased confusion and forgetfulness for the last couple of months. No other complaints.       ALLERGIES  Patient has no known allergies.    PAST MEDICAL HISTORY  Active Ambulatory Problems     Diagnosis Date Noted   • Hypertensive urgency 03/09/2017   • Essential hypertension 05/05/2017   • Hyperlipidemia 05/05/2017     Resolved Ambulatory Problems     Diagnosis Date Noted   • No Resolved Ambulatory Problems     Past Medical History:   Diagnosis Date   • Arthritis    • GERD (gastroesophageal reflux disease)    • Hypertension    • Kidney stone    • Rheumatoid arthritis (CMS/HCC)        PAST SURGICAL HISTORY  Past Surgical History:   Procedure Laterality Date   • HERNIA REPAIR         FAMILY HISTORY  Family History   Problem Relation Age of Onset   • No Known Problems Mother    • No Known Problems Father        SOCIAL HISTORY  Social History     Socioeconomic History   • Marital status:      Spouse name: Not on file   • Number of children: Not on file   • Years of education: Not on file   • Highest education level: Not on file   Tobacco Use   • Smoking status: Never Smoker   Substance and Sexual Activity   • Alcohol use: No   • Drug use: No   • Sexual activity: Defer           REVIEW OF " SYSTEMS  Review of Systems   Constitutional: Negative for activity change, appetite change and fever.   HENT: Negative for congestion and sore throat.    Eyes: Negative.    Respiratory: Negative for cough and shortness of breath.    Cardiovascular: Negative for chest pain and leg swelling.   Gastrointestinal: Negative for abdominal pain, diarrhea and vomiting.   Genitourinary: Negative for decreased urine volume and dysuria.   Musculoskeletal: Positive for neck pain (radiating to the back of his head).   Skin: Negative for rash and wound.   Allergic/Immunologic: Positive for immunocompromised state.   Neurological: Positive for headaches. Negative for weakness and numbness.   Psychiatric/Behavioral: Positive for confusion (increased forgetfulness).   All other systems reviewed and are negative.      PHYSICAL EXAM  ED Triage Vitals   Temp Heart Rate Resp BP SpO2   01/13/20 1452 01/13/20 1452 01/13/20 1510 01/13/20 1510 01/13/20 1452   97.8 °F (36.6 °C) 60 16 115/73 96 %      Temp src Heart Rate Source Patient Position BP Location FiO2 (%)   01/13/20 1452 01/13/20 1452 -- 01/13/20 1602 --   Tympanic Monitor  Right arm      Physical Exam   Constitutional: He is oriented to person, place, and time.   HENT:   Head: Normocephalic and atraumatic.   Medium sized mass to R occipital scalp with mild induration, no erythema or fluctuance.   Eyes: Pupils are equal, round, and reactive to light. EOM are normal.   Neck: Neck supple.   Pain reproduced with movement of neck.   Cardiovascular: Regular rhythm and normal heart sounds. Bradycardia present.   Pulmonary/Chest: Effort normal and breath sounds normal. No respiratory distress.   Abdominal: Soft. There is no tenderness.   Musculoskeletal: Normal range of motion. He exhibits no edema.   Lymphadenopathy:     He has no cervical adenopathy.   Neurological: He is alert and oriented to person, place, and time. He has normal sensation and normal strength. No cranial nerve deficit.    Skin: Skin is warm and dry.   Psychiatric: Mood and affect normal.   Nursing note and vitals reviewed.      LAB RESULTS  Recent Results (from the past 24 hour(s))   Comprehensive Metabolic Panel    Collection Time: 01/13/20  4:25 PM   Result Value Ref Range    Glucose 82 65 - 99 mg/dL    BUN 17 8 - 23 mg/dL    Creatinine 1.54 (H) 0.76 - 1.27 mg/dL    Sodium 138 136 - 145 mmol/L    Potassium 3.8 3.5 - 5.2 mmol/L    Chloride 99 98 - 107 mmol/L    CO2 27.3 22.0 - 29.0 mmol/L    Calcium 9.2 8.6 - 10.5 mg/dL    Total Protein 7.7 6.0 - 8.5 g/dL    Albumin 4.40 3.50 - 5.20 g/dL    ALT (SGPT) 20 1 - 41 U/L    AST (SGOT) 21 1 - 40 U/L    Alkaline Phosphatase 84 39 - 117 U/L    Total Bilirubin 0.5 0.2 - 1.2 mg/dL    eGFR Non African Amer 45 (L) >60 mL/min/1.73    Globulin 3.3 gm/dL    A/G Ratio 1.3 g/dL    BUN/Creatinine Ratio 11.0 7.0 - 25.0    Anion Gap 11.7 5.0 - 15.0 mmol/L   Urinalysis With Microscopic If Indicated (No Culture) - Urine, Clean Catch    Collection Time: 01/13/20  4:25 PM   Result Value Ref Range    Color, UA Yellow Yellow, Straw    Appearance, UA Clear Clear    pH, UA <=5.0 5.0 - 8.0    Specific Gravity, UA 1.015 1.005 - 1.030    Glucose, UA Negative Negative    Ketones, UA Negative Negative    Bilirubin, UA Negative Negative    Blood, UA Negative Negative    Protein, UA Negative Negative    Leuk Esterase, UA Negative Negative    Nitrite, UA Negative Negative    Urobilinogen, UA 0.2 E.U./dL 0.2 - 1.0 E.U./dL   CBC Auto Differential    Collection Time: 01/13/20  4:25 PM   Result Value Ref Range    WBC 12.17 (H) 3.40 - 10.80 10*3/mm3    RBC 5.04 4.14 - 5.80 10*6/mm3    Hemoglobin 15.3 13.0 - 17.7 g/dL    Hematocrit 45.5 37.5 - 51.0 %    MCV 90.3 79.0 - 97.0 fL    MCH 30.4 26.6 - 33.0 pg    MCHC 33.6 31.5 - 35.7 g/dL    RDW 13.0 12.3 - 15.4 %    RDW-SD 42.8 37.0 - 54.0 fl    MPV 10.2 6.0 - 12.0 fL    Platelets 244 140 - 450 10*3/mm3    Neutrophil % 78.8 (H) 42.7 - 76.0 %    Lymphocyte % 10.4 (L) 19.6 - 45.3  %    Monocyte % 7.2 5.0 - 12.0 %    Eosinophil % 2.3 0.3 - 6.2 %    Basophil % 0.7 0.0 - 1.5 %    Immature Grans % 0.6 (H) 0.0 - 0.5 %    Neutrophils, Absolute 9.60 (H) 1.70 - 7.00 10*3/mm3    Lymphocytes, Absolute 1.26 0.70 - 3.10 10*3/mm3    Monocytes, Absolute 0.88 0.10 - 0.90 10*3/mm3    Eosinophils, Absolute 0.28 0.00 - 0.40 10*3/mm3    Basophils, Absolute 0.08 0.00 - 0.20 10*3/mm3    Immature Grans, Absolute 0.07 (H) 0.00 - 0.05 10*3/mm3    nRBC 0.0 0.0 - 0.2 /100 WBC       I ordered the above labs and reviewed the results      RADIOLOGY  CT Head Without Contrast   Final Result   1.  No findings of acute intracranial abnormality.   2.  Other findings as above.           This report was finalized on 1/13/2020 4:56 PM by Dr. William Hall M.D.              I ordered the above noted radiological studies and reviewed the images on the PACS system.  Spoke with Dr. Hall regarding CT/MRI scan results        MEDICATIONS GIVEN IN ER  Medications   morphine injection 4 mg (4 mg Intravenous Given 1/13/20 1737)   ondansetron (ZOFRAN) injection 4 mg (4 mg Intravenous Given 1/13/20 1737)         PROCEDURES  Procedures        COURSE & MEDICAL DECISION MAKING  Pertinent Labs and Imaging studies that were ordered and reviewed are noted above.  Results were reviewed/discussed with the patient and they were also made aware of online access.  Pt also made aware that some labs, such as cultures, will not be resulted during ER visit and follow up with PMD is necessary.         PROGRESS AND CONSULTS    Progress Notes:    ED Course as of Jan 13 2218   Mon Jan 13, 2020   1634 2-day history of enlarging right posterior scalp mass.  Patient also complains of a headache localized to this area too.  I suspect patient likely has tension headache related to swelling.  He has no neurological deficits.  We will check lab work and head CT.    [EE]   1700 No intracranial abnormality.  No neuro deficits.  Suspect possible lipoma versus  "lymphnode to posterior scalp.  Will have pt follow-up with ENT for further eval    On exam pt exhibits no obvious confusion or memory deficits.  Will have pt follow-up with PMD for further eval.      [EE]      ED Course User Index  [EE] Juve Pinedo, PA       1652: Phone call with Dr. Hall, radiology, intracranially negative. Questionable increased fat to occipital area.     1658: Reviewed pt's history and workup with Dr. Chua (ER physician).  After a bedside evaluation, Dr. Chua agrees with the plan of care.    1714: Rechecked pt who is resting comfortably. Informed pt on negative imaging and lab results. Discussed plan to discharge pt home and follow up with ENT for further evaluation. Pt understands and agrees with the plan, all questions answered.        Disposition vitals:  /85 (BP Location: Right arm)   Pulse 56   Temp 97.8 °F (36.6 °C) (Tympanic)   Resp 16   Ht 172.7 cm (68\")   Wt 90.7 kg (200 lb)   SpO2 96%   BMI 30.41 kg/m²         DIAGNOSIS  Final diagnoses:   Scalp mass         DISPOSITION  DISCHARGE    Patient discharged in stable condition.    Reviewed implications of results, diagnosis, meds, responsibility to follow up, warning signs and symptoms of possible worsening, potential complications and reasons to return to ER.    Patient/Family voiced understanding of above instructions.    Discussed plan for discharge, as there is no emergent indication for admission. Pt/family is agreeable and understands need for follow up and repeat testing.  Pt is aware that discharge does not mean that nothing is wrong but it indicates no emergency is present that requires admission and they must continue care with follow-up as given below or physician of their choice.     FOLLOW-UP  Carlin Douglas MD  [address] [facility] [address] [phone]    Schedule an appointment as soon as possible for a visit   for further evaluation         Medication List      No changes were made " to your prescriptions during this visit.             FOLLOW UP   ForCarlin villarreal MD  [address] [facility] [address]  [phone]    Schedule an appointment as soon as possible for a visit   for further evaluation          RX     Medication List      No changes were made to your prescriptions during this visit.         Documentation assistance provided by ANGELO Germain for Juve Pinedo PA-C.  Information recorded by the scribe was done at my direction and has been verified and validated by me.       Rowena Hawkins  01/13/20 1727       Juve Pinedo PA  01/13/20 [id number]

## 2021-03-09 DIAGNOSIS — Z23 IMMUNIZATION DUE: ICD-10-CM
